# Patient Record
Sex: FEMALE | Race: WHITE | NOT HISPANIC OR LATINO | Employment: FULL TIME | URBAN - METROPOLITAN AREA
[De-identification: names, ages, dates, MRNs, and addresses within clinical notes are randomized per-mention and may not be internally consistent; named-entity substitution may affect disease eponyms.]

---

## 2017-02-07 ENCOUNTER — ALLSCRIPTS OFFICE VISIT (OUTPATIENT)
Dept: OTHER | Facility: OTHER | Age: 27
End: 2017-02-07

## 2017-05-12 ENCOUNTER — ALLSCRIPTS OFFICE VISIT (OUTPATIENT)
Dept: OTHER | Facility: OTHER | Age: 27
End: 2017-05-12

## 2017-09-27 ENCOUNTER — ALLSCRIPTS OFFICE VISIT (OUTPATIENT)
Dept: OTHER | Facility: OTHER | Age: 27
End: 2017-09-27

## 2018-01-10 NOTE — MISCELLANEOUS
Message   Recorded as Task   Date: 09/01/2016 04:04 PM, Created By: Belinda Prieto   Task Name: Miscellaneous   Assigned To: Adenike Gillsi   Regarding Patient: Edgar Tang, Status: In Progress   Gera Alcala - 01 Sep 2016 4:04 PM     TASK CREATED  Caller: Self; [phone] (Home)  [facility] please advise pt @  [address] - 01 Sep 2016 4:07 PM     TASK IN [address] [place] [address],[address] - 01 Sep 2016 4:08 PM     TASK EDITED                 made pt aware  Active Problems    1  Abnormal blood chemistry (790 6) (R79 9)   2  Acne (706 1) (L70 9)   3  Adenoma of pituitary (227 3) (D35 2)   4  Chronic migraine (346 70) (G43 709)   5  Chronic migraine without aura without status migrainosus, not intractable (346 70)   (G43 709)   6  Chronic tension-type headache, not intractable (339 12) (G44 229)   7  Contraceptives (V25 02)   8  Cyst of Rathke's pouch (253 8) (E23 6)   9  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)   10  Fatigue (780 79) (R53 83)   11  Herpes simplex type 1 infection (054 9) (B00 9)   12  Hyperprolactinemia (253 1) (E22 1)   13  Hypersomnia with long sleep time, idiopathic (327 11) (G47 11)   14  Motion sickness (994 6) (T75 3XXA)   15  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)   16  Primary stabbing headache (339 85) (G44 85)   17  Sexually Active, Contraception Desired   18  Sleep disturbance (780 50) (G47 9)   19  Vaginal discharge (623 5) (N89 8)   20  Vitamin D deficiency (268 9) (E55 9)    Current Meds   1  Atralin 0 05 % External Gel (Tretinoin); Therapy: [id number] to Recorded   2  Ketorolac Tromethamine 10 MG Oral Tablet; take 1 tablet every 8 hours as needed for   pain; Therapy: 34PQN0248 to (Jakub James)  Requested for: 11Pey5484; Last   Rx:[id number] Ordered   3  Magnesium CAPS; Therapy: ([facility]) to Recorded   4  Mirena 20 MCG/24HR Intrauterine Intrauterine Device; Therapy: ([facility] [place]) to Recorded   5  Spironolactone 50 MG Oral Tablet; TAKE 1 TABLET TWICE DAILY; Therapy: (Recorded:60Yth9652) to Recorded   6  ValACYclovir HCl - 1 GM Oral Tablet; uses 2 grams dsily; Therapy: (Shirlie Grapes) to Recorded   7  Vitamin D 1000 UNIT Oral Tablet; Takes 2 daily; Therapy: (Recorded:28Tdi5922) to Recorded    Allergies    1  Amoxicillin CAPS   2  Codeine Sulfate TABS    3  Nickel   4   Other    Signatures   Electronically signed by : Karina Goldman LPN; Sep  1 8962  5:94VE EST                       (Author)

## 2018-01-12 VITALS — SYSTOLIC BLOOD PRESSURE: 133 MMHG | DIASTOLIC BLOOD PRESSURE: 70 MMHG | TEMPERATURE: 98.5 F

## 2018-01-13 VITALS — SYSTOLIC BLOOD PRESSURE: 138 MMHG | TEMPERATURE: 97.9 F | DIASTOLIC BLOOD PRESSURE: 76 MMHG

## 2018-01-14 VITALS — TEMPERATURE: 98.4 F | DIASTOLIC BLOOD PRESSURE: 83 MMHG | HEART RATE: 82 BPM | SYSTOLIC BLOOD PRESSURE: 132 MMHG

## 2018-01-18 NOTE — MISCELLANEOUS
Message   Recorded as Task   Date: 01/05/2016 02:34 PM, Created By: Elena Flores   Task Name: Call Patient with results   Assigned To: Arnie Viramontes   Regarding Patient: Bubba Alatorre, Status: Complete   Jose Luis Ellis - 05 Jan 2016 2:34 PM     Patient Phone: [phone]      Not all tests were done  will call labs to find out other ordered blood test  For elevted prl and low normal Ft4 will repeat levels  Pls ask patient if she has any new symptoms? Sandra Javed - 05 Jan 2016 3:07 PM     TASK REASSIGNED: Previously Assigned To Jeramy Frederick - 15 Pierre 2016 2:01 PM     TASK EDITED  she hasnt done the other test yet and she will let us know when she does it   Arnie Viramontes - 18 Jan 2016 12:33 PM     TASK COMPLETED   pls politely remind patient one more time about fu labs and plan as outlined in this and prev result note   thanks      Signatures   Electronically signed by : MONET Garcia ; Feb 11 2016  4:05PM EST                       (Author)

## 2018-02-21 ENCOUNTER — TELEPHONE (OUTPATIENT)
Dept: NEUROLOGY | Facility: CLINIC | Age: 28
End: 2018-02-21

## 2018-02-21 ENCOUNTER — PROCEDURE VISIT (OUTPATIENT)
Dept: NEUROLOGY | Facility: CLINIC | Age: 28
End: 2018-02-21
Payer: COMMERCIAL

## 2018-02-21 VITALS — HEART RATE: 95 BPM | TEMPERATURE: 98.6 F | SYSTOLIC BLOOD PRESSURE: 133 MMHG | DIASTOLIC BLOOD PRESSURE: 61 MMHG

## 2018-02-21 DIAGNOSIS — G43.709 CHRONIC MIGRAINE WITHOUT AURA WITHOUT STATUS MIGRAINOSUS, NOT INTRACTABLE: Primary | ICD-10-CM

## 2018-02-21 PROCEDURE — 64615 CHEMODENERV MUSC MIGRAINE: CPT | Performed by: PSYCHIATRY & NEUROLOGY

## 2018-02-21 NOTE — TELEPHONE ENCOUNTER
Pt called states that she was looking at her chart online and noticed an MRI result back in 4/28/15  She has few questions and requesting to talk to you regarding the result          Thank you      [phone]

## 2018-02-21 NOTE — TELEPHONE ENCOUNTER
Patient reports she was actually calling about CT from 2/2015 and mispoke  She is concerned about the lacune noted  Please advise

## 2018-02-21 NOTE — PROGRESS NOTES
Chemodenervation  Date/Time: 2/21/2018 9:56 AM  Performed by: Margo Traore by: Cayla Fatima     Anesthesia (see MAR for exact dosages): Anesthesia method:  None  Procedure details:     Position:  Supine  Botox:     Botox Type:  Type A    mL's of Botulinum Toxin:  100    Final Concentration per CC:  100 units  Procedures:     Indications: migraines    Total Units:     Total units used:  100  Post-procedure details:     Chemodenervation:  Chronic migraine  Comments:      10 units in right temporalis muscle   10 units in the  Right frontalis muscle   35 unit(s) was injected into the  left frontalis muscle  Back of the scar   5 units in front of the scar to even out the arch eye brow  40 unit(s) was injected into the  left temporalis muscle   Around the scar tissue

## 2018-05-11 ENCOUNTER — ANNUAL EXAM (OUTPATIENT)
Dept: OBGYN CLINIC | Facility: CLINIC | Age: 28
End: 2018-05-11
Payer: COMMERCIAL

## 2018-05-11 VITALS
SYSTOLIC BLOOD PRESSURE: 124 MMHG | BODY MASS INDEX: 24.91 KG/M2 | WEIGHT: 155 LBS | DIASTOLIC BLOOD PRESSURE: 72 MMHG | HEIGHT: 66 IN

## 2018-05-11 DIAGNOSIS — Z11.3 SCREENING FOR STDS (SEXUALLY TRANSMITTED DISEASES): ICD-10-CM

## 2018-05-11 DIAGNOSIS — Z01.419 ENCNTR FOR GYN EXAM (GENERAL) (ROUTINE) W/O ABN FINDINGS: Primary | ICD-10-CM

## 2018-05-11 PROCEDURE — 87591 N.GONORRHOEAE DNA AMP PROB: CPT | Performed by: PHYSICIAN ASSISTANT

## 2018-05-11 PROCEDURE — 87491 CHLMYD TRACH DNA AMP PROBE: CPT | Performed by: PHYSICIAN ASSISTANT

## 2018-05-11 PROCEDURE — G0145 SCR C/V CYTO,THINLAYER,RESCR: HCPCS | Performed by: PHYSICIAN ASSISTANT

## 2018-05-11 PROCEDURE — S0612 ANNUAL GYNECOLOGICAL EXAMINA: HCPCS | Performed by: PHYSICIAN ASSISTANT

## 2018-05-11 RX ORDER — DEXTROAMPHETAMINE SACCHARATE, AMPHETAMINE ASPARTATE MONOHYDRATE, DEXTROAMPHETAMINE SULFATE AND AMPHETAMINE SULFATE 2.5; 2.5; 2.5; 2.5 MG/1; MG/1; MG/1; MG/1
CAPSULE, EXTENDED RELEASE ORAL
COMMUNITY
Start: 2017-03-29

## 2018-05-11 RX ORDER — VALACYCLOVIR HYDROCHLORIDE 1 G/1
TABLET, FILM COATED ORAL
COMMUNITY
Start: 2016-12-06

## 2018-05-11 NOTE — PROGRESS NOTES
Rachell Juve  1990    CC:  Yearly exam    S:  33 y o  female here for yearly exam  She is sexually active with a boyfriend of 3 years  She uses Mirena for contraception and is amenorrheic with this  Last Pap 2014      Current Outpatient Prescriptions:     amphetamine-dextroamphetamine (ADDERALL XR, 10MG,) 10 MG 24 hr capsule, Take one capsule in the AM (BRAND NAME), Disp: , Rfl:     onabotulinumtoxin A (BOTOX) 100 units, Inject as directed, Disp: , Rfl:     valACYclovir (VALTREX) 1,000 mg tablet, Take one tablet/day, as needed, Disp: , Rfl:     cholecalciferol (VITAMIN D3) 1,000 units tablet, Take by mouth, Disp: , Rfl:     levonorgestrel (MIRENA, 52 MG,) 20 MCG/24HR IUD, by Intrauterine route, Disp: , Rfl:     Tretinoin-Cleanser-Moisturizer 0 025 % CREAM KIT, EVERY OTHER DAY AT NIGHT, Disp: , Rfl:   Social History     Social History    Marital status: Single     Spouse name: N/A    Number of children: N/A    Years of education: N/A     Occupational History    Not on file  Social History Main Topics    Smoking status: Former Smoker    Smokeless tobacco: Never Used    Alcohol use Yes      Comment: socially     Drug use: No    Sexual activity: Yes     Partners: Male     Birth control/ protection: IUD      Comment: Mirena 3/2015     Other Topics Concern    Not on file     Social History Narrative    No narrative on file     Family History   Problem Relation Age of Onset    Hypertension Mother     Migraines Mother     Hypertension Father     Diabetes Maternal Grandfather     Skin cancer Maternal Grandfather     Hypertension Paternal Grandfather      Past Medical History:   Diagnosis Date    Depression     FH: brain aneurysm     Herpes     Migraine          O:  Blood pressure 124/72, height 5' 6 14" (1 68 m), weight 70 3 kg (155 lb)      Patient appears well and is not in distress  Neck is supple without masses  Breasts are symmetrical without mass, tenderness, nipple discharge, skin changes or adenopathy  Abdomen is soft and nontender without masses  External genitals are normal without lesions or rashes  Vagina is normal without discharge or bleeding  Cervix is normal without discharge or lesion  IUD strings are normal    Uterus is normal, mobile, nontender without palpable mass  Adnexa are normal, nontender, without palpable mass  A:  Yearly exam      P:   Pap and GC/chlamydia today   Mirena due for swap 3/2020   RTO one year for yearly exam or sooner as needed

## 2018-05-16 LAB
CHLAMYDIA DNA CVX QL NAA+PROBE: NORMAL
N GONORRHOEA DNA GENITAL QL NAA+PROBE: NORMAL

## 2018-05-17 LAB
LAB AP GYN PRIMARY INTERPRETATION: NORMAL
Lab: NORMAL

## 2018-06-08 ENCOUNTER — TELEPHONE (OUTPATIENT)
Dept: NEUROLOGY | Facility: CLINIC | Age: 28
End: 2018-06-08

## 2018-06-15 NOTE — TELEPHONE ENCOUNTER
Per Teri, pts Botox order is still stuck in the insur  Verif  Stage, the rep will email the supervisor to have this worked on ASAP  I will f/u early next week to check the status of this order

## 2018-06-18 NOTE — TELEPHONE ENCOUNTER
Per CBC review at [facility]  Pts Botox [facility] was denied due to not enough clinical info  Given to the department  I Faxed over pts notes and botox notes to be reviewed    I will call back tomm to check status

## 2018-06-19 NOTE — TELEPHONE ENCOUNTER
Per MRD at [facility], they still had to ask some questions to review pts Botox auth request  I will f/u on this status [facility]    Phone number

## 2018-06-26 NOTE — TELEPHONE ENCOUNTER
pts 100 units of Botox arrived from [employer] today in [facility]   In Fridge

## 2018-07-06 RX ORDER — ALPRAZOLAM 0.5 MG/1
0.5 TABLET, EXTENDED RELEASE ORAL DAILY
Refills: 1 | COMMUNITY
Start: 2018-06-27

## 2018-07-06 NOTE — PROGRESS NOTES
Chemodenervation  Date/Time: 7/9/2018 11:18 AM  Performed by: Carole [facility]  Authorized by: Matt Eaton details:     Prepped With: Alcohol    Anesthesia (see MAR for exact dosages): Anesthesia method:  None  Procedure details:     Position:  Upright  Botox:     Botox Type:  Type A    Brand:  Botox    Final Concentration per CC:  100 units    Needle Gauge:  30 G 2 5 inch    Medication Administration:  100 Units onabotulinumtoxin A 100 units  Procedures:     Botox Procedures: chronic headache      Indications: migraines    Post-procedure details:     Chemodenervation:  Chronic migraine    Patient tolerance of procedure: Tolerated well, no immediate complications       10 units in right temporalis muscle   10 units in the  Right frontalis muscle   35 unit(s) was injected into the  left frontalis muscle  Back of the scar   5 units in front of the scar to even out the arch eye brow  40 unit(s) was injected into the  left temporalis muscle   Around the scar tissue

## 2018-07-09 ENCOUNTER — PROCEDURE VISIT (OUTPATIENT)
Dept: NEUROLOGY | Facility: CLINIC | Age: 28
End: 2018-07-09
Payer: COMMERCIAL

## 2018-07-09 VITALS — TEMPERATURE: 98.8 F | HEART RATE: 92 BPM | DIASTOLIC BLOOD PRESSURE: 78 MMHG | SYSTOLIC BLOOD PRESSURE: 116 MMHG

## 2018-07-09 DIAGNOSIS — G43.709 CHRONIC MIGRAINE WITHOUT AURA WITHOUT STATUS MIGRAINOSUS, NOT INTRACTABLE: Primary | ICD-10-CM

## 2018-07-09 PROCEDURE — 64615 CHEMODENERV MUSC MIGRAINE: CPT | Performed by: PSYCHIATRY & NEUROLOGY

## 2018-10-17 ENCOUNTER — TELEPHONE (OUTPATIENT)
Dept: NEUROLOGY | Facility: CLINIC | Age: 28
End: 2018-10-17

## 2018-10-25 ENCOUNTER — TELEPHONE (OUTPATIENT)
Dept: NEUROLOGY | Facility: CLINIC | Age: 28
End: 2018-10-25

## 2018-10-31 NOTE — TELEPHONE ENCOUNTER
Botox 200 units arrived today  Placed in [address]  and logged  Will be transferred to [facility] location today

## 2018-11-05 RX ORDER — DOXEPIN HYDROCHLORIDE 6 MG/1
6 TABLET ORAL DAILY
Refills: 0 | COMMUNITY
Start: 2018-09-14

## 2018-11-05 NOTE — PROGRESS NOTES
Chemodenervation  Date/Time: 11/6/2018 9:56 AM  Performed by: Georges Goldberg by: Chelo Zuniga details:     Prepped With: Alcohol    Anesthesia (see MAR for exact dosages): Anesthesia method:  None  Procedure details:     Position:  Upright  Botox:     Botox Type:  Type A    Brand:  Botox    mL's of Botulinum Toxin:  100    Final Concentration per CC:  100 units    Needle Gauge:  30 G 2 5 inch    Medication Administration:  100 Units onabotulinumtoxin A 100 units  Total Units:     Total units used:  100    Total units discarded:  0  Post-procedure details:     Chemodenervation:  Chronic migraine    Facial Nerve Location[de-identified]  Bilateral facial nerve    Patient tolerance of procedure: Tolerated well, no immediate complications      Post-procedure details:     Chemodenervation:  Chronic migraine    Patient tolerance of procedure: Tolerated well, no immediate complications   52 units in right temporalis muscle   10 units in the  Right frontalis muscle   35 unit(s) was injected into the  left frontalis muscle   Back of the scar   5 units in front of the scar to even out the arch eye brow  40 unit(s) was injected into the  left temporalis muscle   Around the scar tissue

## 2018-11-06 PROCEDURE — 64615 CHEMODENERV MUSC MIGRAINE: CPT | Performed by: PSYCHIATRY & NEUROLOGY

## 2018-11-07 ENCOUNTER — PROCEDURE VISIT (OUTPATIENT)
Dept: NEUROLOGY | Facility: CLINIC | Age: 28
End: 2018-11-07
Payer: COMMERCIAL

## 2018-11-07 VITALS — DIASTOLIC BLOOD PRESSURE: 82 MMHG | TEMPERATURE: 98.1 F | SYSTOLIC BLOOD PRESSURE: 130 MMHG

## 2018-11-07 DIAGNOSIS — G43.709 CHRONIC MIGRAINE WITHOUT AURA WITHOUT STATUS MIGRAINOSUS, NOT INTRACTABLE: Primary | ICD-10-CM

## 2019-02-22 ENCOUNTER — TELEPHONE (OUTPATIENT)
Dept: NEUROLOGY | Facility: CLINIC | Age: 29
End: 2019-02-22

## 2019-02-22 NOTE — TELEPHONE ENCOUNTER
General 02/21/2019 12:19 PM Dorene Hitchcock care coordination -   Note    Auth not needed     Please use [facility]      Thank you

## 2019-02-22 NOTE — TELEPHONE ENCOUNTER
Called [facility]- spoke with Anabell Cardona- I made her aware that I was calling to do a refill on the patient's Botox prescription  Refill request initiated  Will need to do a status check in two days

## 2019-02-27 NOTE — TELEPHONE ENCOUNTER
Called [facility]- spoke with Miguel A Nicholson- he states the patient's Botox order is currently in insurance information  Will do a status check in 2 days

## 2019-03-04 NOTE — TELEPHONE ENCOUNTER
Called and spoke with rep Tom Yuan and he stated that the order is till in with insurance  [facility] still needs to be verified  It is under a STAT order  I did give  A NBD of 03/08/19 in their system  I will call back tomorrow to check on the status

## 2019-03-07 NOTE — TELEPHONE ENCOUNTER
Called and spoke with rep Juan Hernandez and I set up delivery for tomorrow 03/08/19 in the   She did state that she was still waiting on consent from the patient so I will call her and have her call [facility] this morning

## 2019-03-08 NOTE — TELEPHONE ENCOUNTER
Spoke with rep Marcia Mahajan who stated order is scheduled to be delivered by the end of day today  [employer] tracking # [id number]

## 2019-03-11 NOTE — PROGRESS NOTES
Chemodenervation  Date/Time: 3/13/2019 9:52 AM  Performed by: Dima Macdonald MD  Authorized by: Dima Macdonald MD     Pre-procedure details:     Prepped With: Alcohol    Anesthesia (see MAR for exact dosages): Anesthesia method:  None  Procedure details:     Position:  Upright  Botox:     Botox Type:  Type A    Brand:  Botox    Final Concentration per CC:  100 units    Needle Gauge:  30 G 2 5 inch    Medication Administration:  100 Units onabotulinumtoxin A 100 units  Post-procedure details:     Chemodenervation:  Chronic migraine    Facial Nerve Location[de-identified]  Bilateral facial nerve    Patient tolerance of procedure: Tolerated well, no immediate complications       Post-procedure details:     Chemodenervation:  Chronic migraine    Patient tolerance of procedure:  Tolerated well, no immediate complications   51 units in right temporalis muscle   10 units in the  Right frontalis muscle   35 unit(s) was injected into the  left frontalis muscle   Back of the scar   5 units in front of the scar to even out the arch eye brow  40 unit(s) was injected into the  left temporalis muscle  Around the scar tissue    Total Units:     Total units used:  100    Total units discarded:  0  Post-procedure details:     Chemodenervation:  Chronic migraine    Facial Nerve Location[de-identified]  Bilateral facial nerve    Patient tolerance of procedure:   Tolerated well, no immediate complications

## 2019-03-13 ENCOUNTER — PROCEDURE VISIT (OUTPATIENT)
Dept: NEUROLOGY | Facility: CLINIC | Age: 29
End: 2019-03-13
Payer: COMMERCIAL

## 2019-03-13 VITALS — HEART RATE: 98 BPM | SYSTOLIC BLOOD PRESSURE: 139 MMHG | TEMPERATURE: 97.5 F | DIASTOLIC BLOOD PRESSURE: 75 MMHG

## 2019-03-13 DIAGNOSIS — G43.709 CHRONIC MIGRAINE WITHOUT AURA WITHOUT STATUS MIGRAINOSUS, NOT INTRACTABLE: Primary | ICD-10-CM

## 2019-03-13 PROCEDURE — 64615 CHEMODENERV MUSC MIGRAINE: CPT | Performed by: PSYCHIATRY & NEUROLOGY

## 2019-05-26 ENCOUNTER — OFFICE VISIT (OUTPATIENT)
Dept: URGENT CARE | Age: 29
End: 2019-05-26
Payer: COMMERCIAL

## 2019-05-26 VITALS
RESPIRATION RATE: 16 BRPM | WEIGHT: 162 LBS | HEART RATE: 82 BPM | HEIGHT: 67 IN | BODY MASS INDEX: 25.43 KG/M2 | TEMPERATURE: 98.5 F | SYSTOLIC BLOOD PRESSURE: 124 MMHG | DIASTOLIC BLOOD PRESSURE: 84 MMHG | OXYGEN SATURATION: 98 %

## 2019-05-26 DIAGNOSIS — K11.20 SIALADENITIS: Primary | ICD-10-CM

## 2019-05-26 PROCEDURE — G0382 LEV 3 HOSP TYPE B ED VISIT: HCPCS | Performed by: NURSE PRACTITIONER

## 2019-05-26 PROCEDURE — S9083 URGENT CARE CENTER GLOBAL: HCPCS | Performed by: NURSE PRACTITIONER

## 2019-05-26 RX ORDER — AMOXICILLIN 500 MG/1
500 CAPSULE ORAL EVERY 12 HOURS SCHEDULED
Qty: 20 CAPSULE | Refills: 0 | Status: SHIPPED | OUTPATIENT
Start: 2019-05-26 | End: 2019-06-05

## 2019-06-24 ENCOUNTER — TELEPHONE (OUTPATIENT)
Dept: NEUROLOGY | Facility: CLINIC | Age: 29
End: 2019-06-24

## 2019-07-18 ENCOUNTER — PROCEDURE VISIT (OUTPATIENT)
Dept: NEUROLOGY | Facility: CLINIC | Age: 29
End: 2019-07-18
Payer: COMMERCIAL

## 2019-07-18 VITALS — SYSTOLIC BLOOD PRESSURE: 132 MMHG | HEART RATE: 112 BPM | DIASTOLIC BLOOD PRESSURE: 70 MMHG | TEMPERATURE: 98.1 F

## 2019-07-18 DIAGNOSIS — G43.709 CHRONIC MIGRAINE WITHOUT AURA WITHOUT STATUS MIGRAINOSUS, NOT INTRACTABLE: Primary | ICD-10-CM

## 2019-07-18 PROCEDURE — 64615 CHEMODENERV MUSC MIGRAINE: CPT | Performed by: PSYCHIATRY & NEUROLOGY

## 2019-07-18 NOTE — PROGRESS NOTES
Chemodenervation  Date/Time: 7/18/2019 12:49 PM  Performed by: Rossy Burdick MD  Authorized by: Rossy Burdick MD     Pre-procedure details:     Prepped With: Alcohol    Anesthesia (see MAR for exact dosages): Anesthesia method:  None  Procedure details:     Position:  Upright  Botox:     Botox Type:  Type A    Brand:  Botox    Final Concentration per CC:  200 units    Needle Gauge:  30 G 2 5 inch    Medication Administration:  100 Units onabotulinumtoxin A 100 units  Post-procedure details:     Chemodenervation:  Chronic migraine    Facial Nerve Location[de-identified]  Bilateral facial nerve    Patient tolerance of procedure: Tolerated well, no immediate complications      Post-procedure details:     Chemodenervation:  Chronic migraine    Patient tolerance of procedure:  Tolerated well, no immediate complications   90 units in right temporalis muscle   10 units in the  Right frontalis muscle   35 unit(s) was injected into the  left frontalis muscle   Back of the scar   5 units in front of the scar to even out the arch eye brow  40 unit(s) was injected into the  left temporalis muscle   Around the scar tissue     Total Units:     Total units used:  100    Total units discarded:  0  Post-procedure details:     Chemodenervation:  Chronic migraine    Facial Nerve Location[de-identified]  Bilateral facial nerve    Patient tolerance of procedure:  Tolerated well, no immediate complications

## 2019-11-06 ENCOUNTER — TELEPHONE (OUTPATIENT)
Dept: NEUROLOGY | Facility: CLINIC | Age: 29
End: 2019-11-06

## 2019-11-06 NOTE — TELEPHONE ENCOUNTER
Patient is scheduled on 11/27/2019 with Dr Vianney Mirza in [facility]

## 2019-11-06 NOTE — TELEPHONE ENCOUNTER
Referral Notes   Number of Notes: 1   Type Date User Summary Attachment   General 11/06/2019 10:51 AM Hal Juárez care coordination  -   Note    Botox- no authorization needed   Please use [facility]      Thank you,     Lisseth Giraldo

## 2019-11-07 NOTE — TELEPHONE ENCOUNTER
Called [facility] and spoke with rep Hanny Fernandez to initiate refill request for patient's Botox 100 unit SDV qty 1  Check back next week for status update

## 2019-11-12 NOTE — TELEPHONE ENCOUNTER
Called [facility] to check on status  Spoke with rep Tim who stated order is pending in insurance verification process  Requested order to updated to STAT request as I need medication delivered by 11/19/19 and MARQUITA SYKES on file is still active  Case # [id number]   Valid: 7/1/19 until 6/30/2020     Check back on Friday for status update

## 2019-11-14 NOTE — TELEPHONE ENCOUNTER
Received a voicemail from Gayla Felty at [facility]- she states she is calling to set up the patient's Botox delivery  Called [facility]- spoke with Terra Dials I advised her that I am calling to set up the patient's Botox delivery  She was able to confirm that the patient's Botox is ready to be set up for delivery and the patient's consent is on file  Botox to be delivered on 11/15/2019 to the [facility] location via [address]- a signature will be required  Nori,    Please await Botox delivery and document once the Botox has arrived  Please let me know if you do not receive the patient's Botox order      Thank you,    Mick Jackson

## 2019-11-27 ENCOUNTER — PROCEDURE VISIT (OUTPATIENT)
Dept: NEUROLOGY | Facility: CLINIC | Age: 29
End: 2019-11-27
Payer: COMMERCIAL

## 2019-11-27 VITALS — HEART RATE: 95 BPM | TEMPERATURE: 98.3 F | DIASTOLIC BLOOD PRESSURE: 82 MMHG | SYSTOLIC BLOOD PRESSURE: 128 MMHG

## 2019-11-27 DIAGNOSIS — G43.709 CHRONIC MIGRAINE WITHOUT AURA WITHOUT STATUS MIGRAINOSUS, NOT INTRACTABLE: Primary | ICD-10-CM

## 2019-11-27 PROCEDURE — 64615 CHEMODENERV MUSC MIGRAINE: CPT | Performed by: PSYCHIATRY & NEUROLOGY

## 2019-11-27 NOTE — PROGRESS NOTES
Chemodenervation  Date/Time: 11/27/2019 1:29 PM  Performed by: Josie Ko MD  Authorized by: Josie Ko MD     Pre-procedure details:     Prepped With: Alcohol    Anesthesia (see MAR for exact dosages): Anesthesia method:  None  Procedure details:     Position:  Upright  Botox:     Botox Type:  Type A    Brand:  Botox    mL's of Botulinum Toxin:  100    Final Concentration per CC:  100 units    Needle Gauge:  30 G 2 5 inch    Medication Administration:  100 Units onabotulinumtoxin A 100 units  Total Units:     Total units used:  100    Total units discarded:  0  Post-procedure details:     Chemodenervation:  Chronic migraine    Facial Nerve Location[de-identified]  Bilateral facial nerve         Post-procedure details:     Chemodenervation:  Chronic migraine    Patient tolerance of procedure:  Tolerated well, no immediate complications   62 units in right temporalis muscle   10 units in the  Right frontalis muscle   35 unit(s) was injected into the  left frontalis muscle   Back of the scar   5 units in front of the scar to even out the arch eye brow  40 unit(s) was injected into the  left temporalis muscle   Around the scar tissue     Total Units:     Total units used:  100    Total units discarded:  0  Post-procedure details:     Chemodenervation:  Chronic migraine    Facial Nerve Location[de-identified]  Bilateral facial nerve    Patient tolerance of procedure:  Tolerated well, no immediate complications

## 2020-01-21 ENCOUNTER — ANNUAL EXAM (OUTPATIENT)
Dept: OBGYN CLINIC | Facility: CLINIC | Age: 30
End: 2020-01-21
Payer: COMMERCIAL

## 2020-01-21 VITALS
SYSTOLIC BLOOD PRESSURE: 122 MMHG | DIASTOLIC BLOOD PRESSURE: 80 MMHG | WEIGHT: 163 LBS | BODY MASS INDEX: 25.58 KG/M2 | HEIGHT: 67 IN

## 2020-01-21 DIAGNOSIS — Z01.419 ENCNTR FOR GYN EXAM (GENERAL) (ROUTINE) W/O ABN FINDINGS: ICD-10-CM

## 2020-01-21 DIAGNOSIS — Z11.3 SCREEN FOR STD (SEXUALLY TRANSMITTED DISEASE): Primary | ICD-10-CM

## 2020-01-21 PROBLEM — G43.709 CHRONIC MIGRAINE WITHOUT AURA WITHOUT STATUS MIGRAINOSUS, NOT INTRACTABLE: Status: RESOLVED | Noted: 2018-02-21 | Resolved: 2020-01-21

## 2020-01-21 PROCEDURE — 87591 N.GONORRHOEAE DNA AMP PROB: CPT | Performed by: PHYSICIAN ASSISTANT

## 2020-01-21 PROCEDURE — G0145 SCR C/V CYTO,THINLAYER,RESCR: HCPCS | Performed by: PHYSICIAN ASSISTANT

## 2020-01-21 PROCEDURE — 87491 CHLMYD TRACH DNA AMP PROBE: CPT | Performed by: PHYSICIAN ASSISTANT

## 2020-01-21 PROCEDURE — S0612 ANNUAL GYNECOLOGICAL EXAMINA: HCPCS | Performed by: PHYSICIAN ASSISTANT

## 2020-01-21 NOTE — PROGRESS NOTES
Amber s  1990    CC:  Yearly exam    S:  30 y o  female here for yearly exam  She is amenorrheic with her Mirena which is due for swap - she will make an appointment for this  Sexual activity: She is sexually active with a partner of a year and a half and notes occasional postcoital spotting - she relates this at times to rougher intercourse  Contraception:  She uses Mirena  for contraception  STD testing:  She does request STD testing today  Gardasil:  She says she has had the Gardasil series  I cannot find documentation of this in her chart  We reviewed ASC guidelines for Pap testing       Last Pap 5/11/18 neg    Family hx of breast cancer: no  Family hx of ovarian cancer: no  Family hx of colon cancer: no      Current Outpatient Medications:     ALPRAZolam ER (XANAX XR) 0 5 MG 24 hr tablet, Take 0 5 mg by mouth daily , Disp: , Rfl: 1    amphetamine-dextroamphetamine (ADDERALL XR, 10MG,) 10 MG 24 hr capsule, Take one capsule in the AM (BRAND NAME), Disp: , Rfl:     cholecalciferol (VITAMIN D3) 1,000 units tablet, Take by mouth, Disp: , Rfl:     levonorgestrel (MIRENA, 52 MG,) 20 MCG/24HR IUD, by Intrauterine route, Disp: , Rfl:     onabotulinumtoxin A (BOTOX) 100 units, Inject as directed, Disp: , Rfl:     SILENOR 3 MG TABS, TAKE 1 5 TABLETS EVERY NIGHT, Disp: , Rfl: 0    valACYclovir (VALTREX) 1,000 mg tablet, Take one tablet/day, as needed, Disp: , Rfl:   Social History     Socioeconomic History    Marital status: Single     Spouse name: Not on file    Number of children: Not on file    Years of education: Not on file    Highest education level: Not on file   Occupational History    Not on file   Social Needs    Financial resource strain: Not on file    Food insecurity:     Worry: Not on file     Inability: Not on file    Transportation needs:     Medical: Not on file     Non-medical: Not on file   Tobacco Use    Smoking status: Former Smoker    Smokeless tobacco: Never Used   Substance and Sexual Activity    Alcohol use: Yes     Comment: socially     Drug use: No    Sexual activity: Yes     Partners: Male     Birth control/protection: IUD     Comment: Mirena 3/2015   Lifestyle    Physical activity:     Days per week: Not on file     Minutes per session: Not on file    Stress: Not on file   Relationships    Social connections:     Talks on phone: Not on file     Gets together: Not on file     Attends [employer]ism service: Not on file     Active member of club or organization: Not on file     Attends meetings of clubs or organizations: Not on file     Relationship status: Not on file    Intimate partner violence:     Fear of current or ex partner: Not on file     Emotionally abused: Not on file     Physically abused: Not on file     Forced sexual activity: Not on file   Other Topics Concern    Not on file   Social History Narrative    Not on file     Family History   Problem Relation Age of Onset    Hypertension Mother     Migraines Mother     Hypertension Father     Diabetes Maternal Grandfather     Skin cancer Maternal Grandfather     Hypertension Paternal Grandfather      Past Medical History:   Diagnosis Date    Depression     FH: brain aneurysm     Herpes     Migraine        Review of Systems   Respiratory: Negative  Cardiovascular: Negative  Gastrointestinal: Negative for constipation and diarrhea  Genitourinary: Negative for difficulty urinating, pelvic pain, vaginal bleeding, vaginal discharge, itching or odor  O:  Blood pressure 122/80, height 5' 6 93" (1 7 m), weight 73 9 kg (163 lb)  Patient appears well and is not in distress  Neck is supple without masses  Breasts are symmetrical without mass, tenderness, nipple discharge, skin changes or adenopathy  Abdomen is soft and nontender without masses  External genitals are normal without lesions or rashes    Urethra and urethral meatus are normal  Bladder is normal to palpation  Vagina is normal without discharge or bleeding  Cervix is normal without discharge or lesion  IUD strings are normal    Uterus is normal, mobile, nontender without palpable mass  Adnexa are normal, nontender, without palpable mass  A:   Yearly exam     Postcoital bleeding  P:   Pap and GC/chlamydia today   Return for Mirena insertion     RTO one year for yearly exam or sooner as needed

## 2020-01-22 LAB
C TRACH DNA SPEC QL NAA+PROBE: NEGATIVE
N GONORRHOEA DNA SPEC QL NAA+PROBE: NEGATIVE

## 2020-01-23 LAB
LAB AP GYN PRIMARY INTERPRETATION: NORMAL
Lab: NORMAL

## 2020-03-05 ENCOUNTER — TELEPHONE (OUTPATIENT)
Dept: NEUROLOGY | Facility: CLINIC | Age: 30
End: 2020-03-05

## 2020-03-05 NOTE — TELEPHONE ENCOUNTER
[address]- spoke with Palmira Fowler- I advised her I was calling to initiate a refill on the patient's Botox prescription  Refill request initiated  Will do a status check in 2 days

## 2020-03-09 NOTE — TELEPHONE ENCOUNTER
[address] Kuo- spoke with Thang Fernández- she states the patient's Botox order is still in insurance verification  An e-mail was sent to the insurance team to expedite this order  Order marked as STAT  Will do a status check in 2 days

## 2020-03-10 ENCOUNTER — TELEPHONE (OUTPATIENT)
Dept: OBGYN CLINIC | Facility: CLINIC | Age: 30
End: 2020-03-10

## 2020-03-11 NOTE — TELEPHONE ENCOUNTER
Called [facility] and spoke with rep David Palomo to check on status  Botox order is still pending in [facility] medical for verification  David Palomo is sending e-mail to [facility] supervisor to expedite order  She did note that order is pre scheduled for delivery to [facility] office for Tuesday 3/17/2020  Do status check in 2 days

## 2020-03-13 ENCOUNTER — PROCEDURE VISIT (OUTPATIENT)
Dept: OBGYN CLINIC | Facility: CLINIC | Age: 30
End: 2020-03-13
Payer: COMMERCIAL

## 2020-03-13 VITALS — SYSTOLIC BLOOD PRESSURE: 120 MMHG | DIASTOLIC BLOOD PRESSURE: 72 MMHG | BODY MASS INDEX: 25.9 KG/M2 | WEIGHT: 165 LBS

## 2020-03-13 DIAGNOSIS — Z30.430 ENCOUNTER FOR IUD INSERTION: Primary | ICD-10-CM

## 2020-03-13 PROCEDURE — 58300 INSERT INTRAUTERINE DEVICE: CPT | Performed by: PHYSICIAN ASSISTANT

## 2020-03-13 PROCEDURE — 58301 REMOVE INTRAUTERINE DEVICE: CPT | Performed by: PHYSICIAN ASSISTANT

## 2020-03-13 NOTE — TELEPHONE ENCOUNTER
[address] Dickson spoke with Aldair Farnsworth- she states the patient's Botox order is still in insurance verification  She is going to send them an e-mail to the [facility] team to expedite this order and make her aware of the patient's appointment on 3/31  She is putting a need by date of 3/18/20 on the order as well  Order marked as STAT  Will do a status check in 2 days

## 2020-03-13 NOTE — PROGRESS NOTES
Agustinjanicedarrell Josie  1990      CC:  IUD swap    S:  30 y o  female here for IUD swap  The reason for her swap is  Mirena  She requests Mirena IUD  We reviewed the risks of IUD insertion including pain, perforation, migration, perforation, irregular bleeding, and infection  She is aware that with Mirena she can expect up to 6 months of irregular bleeding and at that point her periods should become lighter, shorter, and less crampy, and that many women stop having periods with their Mirena  She did premedicate with ibuprofen and a snack prior to insertion  Package insert consent was signed by myself and the patient prior to the procedure  O:   Blood pressure 120/72, weight 74 8 kg (165 lb)  Patient appears well and is not in distress  Abdomen is soft and nontender  External genitals are normal without lesion or rash  Vagina is normal with menses present  Cervix is normal without lesion  PROCEDURE:   IUD strings were visualized, grasped with a ring forceps, and removed without difficulty  The cervix was cleansed with Betadine  The uterus was sounded to 7 cm  IUD was inserted to the fundus without dilation and without tenaculum  Strings were trimmed to 3cm    The patient tolerated the procedure well without complication  A:  IUD swap    P: The patient will return in one month for IUD check but knows to call sooner should she have problems prior to that visit

## 2020-03-16 NOTE — TELEPHONE ENCOUNTER
75 Aparna Kuo- spoke with Lisa Tom- she states the patient's Botox order is currently in insurance verification  Will reach out to Sergio Gonzales to expedite the patient's order

## 2020-03-16 NOTE — TELEPHONE ENCOUNTER
Called and spoke with Andrade Daniel- she is going to expedite this order and complete the process so I can set up delivery for the patient's upcoming appointment  Will do a status check tomorrow

## 2020-03-17 NOTE — TELEPHONE ENCOUNTER
75 Aparna Kuo- spoke with Nirali Call- she states the patient's Botox order is ready to be scheduled for delivery  Patient's consent is on file  Botox to be delivered on Tuesday 3/17/2020 to the [facility] location via [facility] overnight- a signature will be required  Jacky Myers,    Please await Botox delivery and document once it has arrived  Please let me know if you do not receive the patient's Botox order      Thank you,    Mendel Temple

## 2020-03-17 NOTE — TELEPHONE ENCOUNTER
Botox number of units: 100 units  Botox quantity:1  Arrived at what location:  [facility]   Lot number: [id number]    Placed in [address]  and logged

## 2020-03-31 ENCOUNTER — PROCEDURE VISIT (OUTPATIENT)
Dept: NEUROLOGY | Facility: CLINIC | Age: 30
End: 2020-03-31
Payer: COMMERCIAL

## 2020-03-31 VITALS — DIASTOLIC BLOOD PRESSURE: 88 MMHG | HEART RATE: 92 BPM | SYSTOLIC BLOOD PRESSURE: 128 MMHG | TEMPERATURE: 98.6 F

## 2020-03-31 DIAGNOSIS — G43.709 CHRONIC MIGRAINE WITHOUT AURA WITHOUT STATUS MIGRAINOSUS, NOT INTRACTABLE: Primary | ICD-10-CM

## 2020-03-31 PROCEDURE — 64615 CHEMODENERV MUSC MIGRAINE: CPT | Performed by: PSYCHIATRY & NEUROLOGY

## 2020-03-31 NOTE — PROGRESS NOTES
Chemodenervation  Date/Time: 3/31/2020 1:46 PM  Performed by: Neela Coley MD  Authorized by: Neela Coley MD     Pre-procedure details:     Prepped With: Alcohol    Anesthesia (see MAR for exact dosages): Anesthesia method:  None  Procedure details:     Position:  Upright  Botox:     Botox Type:  Type A    Brand:  Botox    mL's of Botulinum Toxin:  100    Final Concentration per CC:  100 units    Needle Gauge:  30 G 2 5 inch    Medication Administration:  100 Units onabotulinumtoxin A 100 units  Total Units:     Total units used:  100    Total units discarded:  0  Post-procedure details:     Chemodenervation:  Chronic migraine    Facial Nerve Location[de-identified]  Bilateral facial nerve    Patient tolerance of procedure: Tolerated well, no immediate complications           Post-procedure details:     Chemodenervation:  Chronic migraine    35 unit(s) was injected into the  left frontalis muscle   Back of the scar     5 units in front of the scar to even out the arch eye brow    40 unit(s) was injected into the  left temporalis muscle   Around the scar tissue    10 units in right temporalis muscle     10 units in the  Right frontalis muscle          Total Units:     Total units used:  100    Total units discarded:  0    Post-procedure details:     Chemodenervation:  Chronic migraine    Facial Nerve Location[de-identified]  Bilateral facial nerve    Patient tolerance of procedure:  Tolerated well, no immediate complications

## 2020-04-07 ENCOUNTER — TELEMEDICINE (OUTPATIENT)
Dept: OBGYN CLINIC | Facility: CLINIC | Age: 30
End: 2020-04-07
Payer: COMMERCIAL

## 2020-04-07 VITALS — BODY MASS INDEX: 25.27 KG/M2 | WEIGHT: 161 LBS

## 2020-04-07 DIAGNOSIS — N92.6 IRREGULAR BLEEDING: Primary | ICD-10-CM

## 2020-04-07 PROCEDURE — 99212 OFFICE O/P EST SF 10 MIN: CPT | Performed by: PHYSICIAN ASSISTANT

## 2020-04-10 ENCOUNTER — TRANSCRIBE ORDERS (OUTPATIENT)
Dept: ADMINISTRATIVE | Facility: HOSPITAL | Age: 30
End: 2020-04-10

## 2020-04-10 DIAGNOSIS — E04.1 THYROID NODULE: Primary | ICD-10-CM

## 2020-04-21 ENCOUNTER — HOSPITAL ENCOUNTER (OUTPATIENT)
Dept: RADIOLOGY | Facility: HOSPITAL | Age: 30
Discharge: HOME | End: 2020-04-21
Attending: FAMILY MEDICINE
Payer: COMMERCIAL

## 2020-04-21 DIAGNOSIS — E04.1 THYROID NODULE: ICD-10-CM

## 2020-04-21 PROCEDURE — 76536 US EXAM OF HEAD AND NECK: CPT

## 2020-06-30 ENCOUNTER — TELEPHONE (OUTPATIENT)
Dept: NEUROLOGY | Facility: CLINIC | Age: 30
End: 2020-06-30

## 2020-07-12 NOTE — PROGRESS NOTES
Patient ID: Ninfa Mcardle is a 30 y o  female  Assessment/Plan:    chronic migraine    This is a 80-year-old female with longstanding history of chronic migraines  Pt doing very well, no reported headaches, scar sensetivity well controlled with Botox as well  --  Continue Botox  Since starting Botox, patient reports greater than 7 days a migraine relief from baseline, correlated with headache diary  Decreased abortive medication use and decreased ER visits  Last Botox injections from March  -- patient to limit  Use of NSAID to no more than 3 times a week  --- maintain headache diary  -- maintain adequate fluid intake and appropriate sleep hygiene    Headache [facility]:   The patient was counseled regarding;   Discussed side effects of all medications prescribed today to the patient in detail  See above  Patient education was completed today and we also discussed precautions for rebound headaches  When patient has a moderate to severe headache, they should seek rest, initiate relaxation and apply cold compresses to the head  Also recommended to the patient :  1  Maintain regular sleep schedule  2  Limit over the counter medications  (No more than 3 times a week)  3  Maintain headache diary  4  Limit caffeine to 1-2 cups a day or less  5  Avoid dietary trigger  (list given to the patient and reviewed with them)  6  Patient is to have regular frequent meals to prevent headache onset  No problem-specific Assessment & Plan notes found for this encounter  Facial neuralgia    migraine     Diagnoses and all orders for this visit:    Migraine with aura and without status migrainosus, not intractable    Facial neuralgia           Subjective:    HPI    Grace Daniel is a 80-year-old,  Right-handed female with history of prior left aneurysm clipping in 2014 who presents for neurologic follow-up for chronic migraines  Patient's headaches are better with Botox  She has chronic nerve pain along the scar line   At this time, Headaches well controlled, patient not describing having any significant issues  The sensitivity around her scar side well controlled  What is your current pain level â 0/10  Headaches started at what age [de-identified] 14 y/o  Accident or injury prior to onset of headaches - none  How often do the headaches occur â migraine once every 2-3 months  What time of the day do the headaches start âvaries  How long do the headaches last - hours until she sleeps and wakes up the migraine is usually better  Are you ever headache free - yes  Where are they located âleft frontal, left temple  What is the intensity of pain âaverage pain level 1/10 mild up to 4/10  Any warning prior to headache and how long do they last â it started on the right eye with a circular area that tends to look a prism and is blurry  This can last for about an half hour or less and then headache starts immediately after  Sometimes vision loss for an hour or two  Describe your usual headache - sharp, throbbing, pounding  Associated symptoms: photophobia, sensitivity to smell, loss of appetite, nausea, prefer to be alone and in a dark room, unable to work, blurred vision  Headaches are worse if the patient: cough, sneeze, bending over  Headache trigger: computer screen lights, bright lights/ artificial and not sunlight  Are you current pregnant or planning on getting pregnant? no  What medications do you take or have you taken for your headaches? PREVENTIVE: Pamelor, Cyclobenzaprine, Verapamil, vitamin B2, gabapentin- caused diplopia , mag- 400 mg daily  ABORTIVE: Advil, Aleve, Toradol  Non-Medical/Alternative Treatments used in the past for headaches: none      Sleep disturbance: Since last seen has remained the same  Feels tired upon waking, sleep is somewhat restful, difficulty falling asleep, will awake 2-3 times a night, on average will get 8 hr of sleep a night  States only feels restful with 10+hr of sleep   Will mumble in sleep  Does note vivid dreams, and has nightmares  Had sleep study in high school told does not have sleep apnea but did not see sleep specialist  Took trazodone once with decreased sleep so has not taken again  Referred to sleep center told has poor sleep hygiene  The following portions of the patient's history were reviewed and updated as appropriate: allergies, current medications, past family history, past medical history, past social history, past surgical history and problem list          Objective:  Current Outpatient Medications   Medication Sig Dispense Refill    ALPRAZolam ER (XANAX XR) 0 5 MG 24 hr tablet Take 0 5 mg by mouth daily   1    amphetamine-dextroamphetamine (ADDERALL XR, 10MG,) 10 MG 24 hr capsule Take one capsule in the AM (BRAND NAME)      cholecalciferol (VITAMIN D3) 1,000 units tablet Take by mouth      levonorgestrel (MIRENA, 52 MG,) 20 MCG/24HR IUD by Intrauterine route      onabotulinumtoxin A (BOTOX) 100 units Inject as directed      SILENOR 3 MG TABS TAKE 1 5 TABLETS EVERY NIGHT  0    valACYclovir (VALTREX) 1,000 mg tablet Take one tablet/day, as needed       No current facility-administered medications for this visit  Blood pressure 138/87, pulse 92, temperature (!) 97 2 °F (36 2 °C), height 5' 7" (1 702 m), weight 75 3 kg (166 lb)  Physical Exam   Constitutional: She appears well-developed  HENT:   Head: Normocephalic  Eyes: Pupils are equal, round, and reactive to light  Neck: Normal range of motion  Cardiovascular: Normal rate and regular rhythm  Pulmonary/Chest: Effort normal    Musculoskeletal: Normal range of motion  Neurological: She has normal strength and normal reflexes  Coordination normal    Skin: Skin is warm  Psychiatric: She has a normal mood and affect  Her speech is normal and behavior is normal  Judgment and thought content normal        Neurological Exam  Mental Status  Awake, alert and oriented to person, place and time   Speech is normal  Language is fluent with no aphasia  Cranial Nerves  CN II: Visual fields full to confrontation  CN III, IV, VI: Extraocular movements intact bilaterally  Pupils equal round and reactive to light bilaterally  CN V: Facial sensation is normal   CN VII: Full and symmetric facial movement  CN XI: Shoulder shrug strength is normal   CN XII: Tongue midline without atrophy or fasciculations  Motor  Normal muscle bulk throughout  Normal muscle tone  Strength is 5/5 throughout all four extremities  Sensory  Sensation is intact to light touch, pinprick, vibration and proprioception in all four extremities  Reflexes  Deep tendon reflexes are 2+ and symmetric in all four extremities with downgoing toes bilaterally  Coordination  Finger-to-nose, rapid alternating movements and heel-to-shin normal bilaterally without dysmetria  Gait  Normal casual, toe, heel and tandem gait  ROS: personally reviewed with patient today's visit    Review of Systems  Constitutional: Negative  Negative for appetite change and fever  HENT: Negative  Negative for hearing loss, tinnitus, trouble swallowing and voice change  Eyes: Negative  Negative for photophobia and pain  Respiratory: Negative  Negative for shortness of breath  Cardiovascular: Negative  Negative for palpitations  Gastrointestinal: Negative  Negative for nausea and vomiting  Endocrine: Negative  Negative for cold intolerance  Genitourinary: Negative  Negative for dysuria, frequency and urgency  Musculoskeletal: Negative  Negative for myalgias and neck pain  Skin: Negative  Negative for rash  Neurological: Negative  Negative for dizziness, tremors, seizures, syncope, facial asymmetry, speech difficulty, weakness, light-headedness, numbness and headaches  Feeling well since injection   Hematological: Negative  Does not bruise/bleed easily  Psychiatric/Behavioral: Negative    Negative for confusion, hallucinations and sleep disturbance  Constitutional: Negative  Negative for appetite change and fever  HENT: Negative  Negative for hearing loss, tinnitus, trouble swallowing and voice change  Eyes: Negative  Negative for photophobia and pain  Respiratory: Negative  Negative for shortness of breath  Cardiovascular: Negative  Negative for palpitations  Gastrointestinal: Negative  Negative for nausea and vomiting  Endocrine: Negative  Negative for cold intolerance  Genitourinary: Negative  Negative for dysuria, frequency and urgency  Musculoskeletal: Negative  Negative for myalgias and neck pain  Skin: Negative  Negative for rash  Neurological: Negative  Negative for dizziness, tremors, seizures, syncope, facial asymmetry, speech difficulty, weakness, light-headedness, numbness and headaches  Feeling well since injection   Hematological: Negative  Does not bruise/bleed easily  Psychiatric/Behavioral: Negative  Negative for confusion, hallucinations and sleep disturbance

## 2020-07-13 ENCOUNTER — OFFICE VISIT (OUTPATIENT)
Dept: NEUROLOGY | Facility: CLINIC | Age: 30
End: 2020-07-13
Payer: COMMERCIAL

## 2020-07-13 VITALS
DIASTOLIC BLOOD PRESSURE: 87 MMHG | HEIGHT: 67 IN | WEIGHT: 166 LBS | TEMPERATURE: 97.2 F | SYSTOLIC BLOOD PRESSURE: 138 MMHG | BODY MASS INDEX: 26.06 KG/M2 | HEART RATE: 92 BPM

## 2020-07-13 DIAGNOSIS — G51.8 FACIAL NEURALGIA: ICD-10-CM

## 2020-07-13 DIAGNOSIS — G43.109 MIGRAINE WITH AURA AND WITHOUT STATUS MIGRAINOSUS, NOT INTRACTABLE: Primary | ICD-10-CM

## 2020-07-13 PROCEDURE — 99214 OFFICE O/P EST MOD 30 MIN: CPT | Performed by: NURSE PRACTITIONER

## 2020-07-13 NOTE — PROGRESS NOTES
Patient ID: Deni Brown is a 30 y o  female  Assessment/Plan:    No problem-specific Assessment & Plan notes found for this encounter  {Assess/PlanSmartLinks:54925}       Subjective:    HPI    {[facility] HPI texts:90550}    {Common ambulatory SmartLinks:54142}         Objective:    Blood pressure 138/87, pulse 92, temperature (!) 97 2 °F (36 2 °C), height 5' 7" (1 702 m), weight 75 3 kg (166 lb)  Physical Exam    Neurological Exam      ROS:    Review of Systems   Constitutional: Negative  Negative for appetite change and fever  HENT: Negative  Negative for hearing loss, tinnitus, trouble swallowing and voice change  Eyes: Negative  Negative for photophobia and pain  Respiratory: Negative  Negative for shortness of breath  Cardiovascular: Negative  Negative for palpitations  Gastrointestinal: Negative  Negative for nausea and vomiting  Endocrine: Negative  Negative for cold intolerance  Genitourinary: Negative  Negative for dysuria, frequency and urgency  Musculoskeletal: Negative  Negative for myalgias and neck pain  Skin: Negative  Negative for rash  Neurological: Negative  Negative for dizziness, tremors, seizures, syncope, facial asymmetry, speech difficulty, weakness, light-headedness, numbness and headaches  Feeling well since injection   Hematological: Negative  Does not bruise/bleed easily  Psychiatric/Behavioral: Negative  Negative for confusion, hallucinations and sleep disturbance

## 2020-07-13 NOTE — PATIENT INSTRUCTIONS
Patient clinically stable from a headache standpoint  Continue with Botox  Patient call the office given any increase in her headaches

## 2020-07-22 ENCOUNTER — TELEPHONE (OUTPATIENT)
Dept: NEUROLOGY | Facility: CLINIC | Age: 30
End: 2020-07-22

## 2020-07-22 NOTE — TELEPHONE ENCOUNTER
Patient is scheduled with Dr Aimee Rios on 8/18/2020 in the [facility] [address]

## 2020-07-22 NOTE — TELEPHONE ENCOUNTER
Type Date User Summary Attachment   General 07/21/2020  1:44 PM Zora Finch care coordination  -   Note    Botox- authorization #: [id number]- valid for 4 visits- from 7/16/2020 until 7/16/2021   Please use [facility]      Thank you,     Efraín Bonner

## 2020-07-31 NOTE — TELEPHONE ENCOUNTER
75 Aparna Kuo- spoke with Braydon Reynolds- I advised him I was calling to initiate a refill request for the patient's Botox order  She states there are no refills left on the patient's Botox prescription  Will transfer me to a pharmacist to provide a verbal Rx  Spoke with Teresa Beckman, pharmacist- provided a verbal Rx for Botox 100 units QTY: 1 under [facility] for Chronic Migraine with 3 refills  Will do a status check in 2 days

## 2020-08-03 ENCOUNTER — TELEPHONE (OUTPATIENT)
Dept: NEUROLOGY | Facility: CLINIC | Age: 30
End: 2020-08-03

## 2020-08-03 NOTE — TELEPHONE ENCOUNTER
Called patient and left a message informing her that her appointment has been changed from 08/04/20 to 08/18/20 due to Dr eBn Hogan being out of the office this week

## 2020-08-04 NOTE — TELEPHONE ENCOUNTER
75 Aparna Kuo- spoke with Staci Patricia- she states the patient's Botox order is in insurance verification in [facility]  Will do a status check in 2 days

## 2020-08-05 NOTE — TELEPHONE ENCOUNTER
Patient called back and left voicemail requesting to reschedule her December appointment to match the reschedule of the August appointment

## 2020-08-05 NOTE — TELEPHONE ENCOUNTER
Called patient and left a message to call back to reschedule her December appointment per her request

## 2020-08-06 NOTE — TELEPHONE ENCOUNTER
75 Aparna Kuo- spoke with Evelyn Kraft- she states the patient's Botox order is insurance verification  Will reach out to Nii Isabel to see if she can assist with this order  Delivery pre-scheduled for Thursday 8/13/2020 to the [facility] location  Will do a status check in 2 days

## 2020-08-06 NOTE — TELEPHONE ENCOUNTER
Called and left a detailed message with Shira Elizabeth to see if she can assist with this order  She is aware to call me back if there are any issues with the patient's order  Will do a status check in 2 days

## 2020-08-07 NOTE — TELEPHONE ENCOUNTER
Called [facility] specialty pharmacy- spoke with Danitza Abernathy- he states the patient's Botox order is in insurance verification  Will do a status check in 2 days

## 2020-08-10 NOTE — TELEPHONE ENCOUNTER
75 Aparna Kuo- spoke with Diane Case- she states the patient's Botox order is ready to be scheduled for delivery  Patient's consent is not on file  I attempted to contact the patient while I had [facility] on the phone- the patient did answer  She provided a blanket consent for 2020  Botox to be delivered on Thursday 8/13/2020 in the [facility] location via [address] [facility] overnight- a signature will be required  Nori,    Please await the patient's Botox order and document once it has arrived  Please let me know if you do not receive the patient's Botox order      Thank you,    Tatyana Rodrigez

## 2020-08-13 NOTE — TELEPHONE ENCOUNTER
Botox number of units: 100 units  Botox quantity: 1  Arrived at what location: [facility]  Lot number: [id number]  Expiration Date: 03/2023    Placed in [address]  and logged

## 2020-08-18 ENCOUNTER — PROCEDURE VISIT (OUTPATIENT)
Dept: NEUROLOGY | Facility: CLINIC | Age: 30
End: 2020-08-18
Payer: COMMERCIAL

## 2020-08-18 VITALS
HEIGHT: 67 IN | BODY MASS INDEX: 25.25 KG/M2 | SYSTOLIC BLOOD PRESSURE: 110 MMHG | WEIGHT: 160.9 LBS | TEMPERATURE: 97.8 F | DIASTOLIC BLOOD PRESSURE: 74 MMHG | HEART RATE: 92 BPM

## 2020-08-18 DIAGNOSIS — G43.709 CHRONIC MIGRAINE WITHOUT AURA WITHOUT STATUS MIGRAINOSUS, NOT INTRACTABLE: Primary | ICD-10-CM

## 2020-08-18 PROCEDURE — 64615 CHEMODENERV MUSC MIGRAINE: CPT | Performed by: PSYCHIATRY & NEUROLOGY

## 2020-08-18 NOTE — PROGRESS NOTES
Chemodenervation    Date/Time: 8/18/2020 1:26 PM  Performed by: Nabil Titus MD  Authorized by: Nabil Titus MD     Pre-procedure details:     Prepped With: Alcohol    Anesthesia (see MAR for exact dosages): Anesthesia method:  None  Procedure details:     Position:  Upright  Botox:     Botox Type:  Type A    Brand:  Botox    mL's of Botulinum Toxin:  100    Final Concentration per CC:  100 units    Needle Gauge:  30 G 2 5 inch  Total Units:     Total units used:  100    Total units discarded:  0  Post-procedure details:     Chemodenervation:  Chronic migraine    Facial Nerve Location[de-identified]  Bilateral facial nerve    Patient tolerance of procedure: Tolerated well, no immediate complications           Post-procedure details:     Chemodenervation:  Chronic migraine    35 unit(s) was injected into the  left frontalis muscle   Back of the scar     5 units in front of the scar to even out the arch eye brow    40 unit(s) was injected into the  left temporalis muscle   Around the scar tissue    10 units in right temporalis muscle     10 units in the  Right frontalis muscle          Total Units:     Total units used:  100    Total units discarded:  0    Post-procedure details:     Chemodenervation:  Chronic migraine    Facial Nerve Location[de-identified]  Bilateral facial nerve    Patient tolerance of procedure:  Tolerated well, no immediate complications

## 2020-11-10 ENCOUNTER — TELEPHONE (OUTPATIENT)
Dept: NEUROLOGY | Facility: CLINIC | Age: 30
End: 2020-11-10

## 2020-12-15 ENCOUNTER — TELEPHONE (OUTPATIENT)
Dept: NEUROLOGY | Facility: CLINIC | Age: 30
End: 2020-12-15

## 2020-12-24 ENCOUNTER — TELEPHONE (OUTPATIENT)
Dept: NEUROLOGY | Facility: CLINIC | Age: 30
End: 2020-12-24

## 2020-12-29 ENCOUNTER — PROCEDURE VISIT (OUTPATIENT)
Dept: NEUROLOGY | Facility: CLINIC | Age: 30
End: 2020-12-29
Payer: COMMERCIAL

## 2020-12-29 VITALS — SYSTOLIC BLOOD PRESSURE: 135 MMHG | DIASTOLIC BLOOD PRESSURE: 65 MMHG | HEART RATE: 89 BPM | TEMPERATURE: 98.3 F

## 2020-12-29 DIAGNOSIS — G43.709 CHRONIC MIGRAINE WITHOUT AURA WITHOUT STATUS MIGRAINOSUS, NOT INTRACTABLE: Primary | ICD-10-CM

## 2020-12-29 PROCEDURE — 64615 CHEMODENERV MUSC MIGRAINE: CPT | Performed by: PSYCHIATRY & NEUROLOGY

## 2021-02-10 ENCOUNTER — LAB (OUTPATIENT)
Dept: LAB | Age: 31
End: 2021-02-10
Payer: COMMERCIAL

## 2021-02-10 ENCOUNTER — TRANSCRIBE ORDERS (OUTPATIENT)
Dept: ADMINISTRATIVE | Age: 31
End: 2021-02-10

## 2021-02-10 DIAGNOSIS — Z00.00 ROUTINE GENERAL MEDICAL EXAMINATION AT A HEALTH CARE FACILITY: ICD-10-CM

## 2021-02-10 DIAGNOSIS — Z00.00 ROUTINE GENERAL MEDICAL EXAMINATION AT A HEALTH CARE FACILITY: Primary | ICD-10-CM

## 2021-02-10 LAB
ALBUMIN SERPL BCP-MCNC: 4 G/DL (ref 3.5–5)
ALP SERPL-CCNC: 45 U/L (ref 46–116)
ALT SERPL W P-5'-P-CCNC: 21 U/L (ref 12–78)
ANION GAP SERPL CALCULATED.3IONS-SCNC: 4 MMOL/L (ref 4–13)
AST SERPL W P-5'-P-CCNC: 12 U/L (ref 5–45)
BASOPHILS # BLD AUTO: 0.05 THOUSANDS/ΜL (ref 0–0.1)
BASOPHILS NFR BLD AUTO: 1 % (ref 0–1)
BILIRUB SERPL-MCNC: 0.72 MG/DL (ref 0.2–1)
BUN SERPL-MCNC: 9 MG/DL (ref 5–25)
CALCIUM SERPL-MCNC: 8.8 MG/DL (ref 8.3–10.1)
CHLORIDE SERPL-SCNC: 105 MMOL/L (ref 100–108)
CHOLEST SERPL-MCNC: 200 MG/DL (ref 50–200)
CO2 SERPL-SCNC: 29 MMOL/L (ref 21–32)
CREAT SERPL-MCNC: 0.83 MG/DL (ref 0.6–1.3)
EOSINOPHIL # BLD AUTO: 0.16 THOUSAND/ΜL (ref 0–0.61)
EOSINOPHIL NFR BLD AUTO: 3 % (ref 0–6)
ERYTHROCYTE [DISTWIDTH] IN BLOOD BY AUTOMATED COUNT: 13.3 % (ref 11.6–15.1)
GFR SERPL CREATININE-BSD FRML MDRD: 96 ML/MIN/1.73SQ M
GLUCOSE P FAST SERPL-MCNC: 81 MG/DL (ref 65–99)
HCT VFR BLD AUTO: 41.1 % (ref 34.8–46.1)
HDLC SERPL-MCNC: 79 MG/DL
HGB BLD-MCNC: 13.4 G/DL (ref 11.5–15.4)
IMM GRANULOCYTES # BLD AUTO: 0.01 THOUSAND/UL (ref 0–0.2)
IMM GRANULOCYTES NFR BLD AUTO: 0 % (ref 0–2)
LDLC SERPL CALC-MCNC: 108 MG/DL (ref 0–100)
LYMPHOCYTES # BLD AUTO: 1.65 THOUSANDS/ΜL (ref 0.6–4.47)
LYMPHOCYTES NFR BLD AUTO: 31 % (ref 14–44)
MCH RBC QN AUTO: 29.6 PG (ref 26.8–34.3)
MCHC RBC AUTO-ENTMCNC: 32.6 G/DL (ref 31.4–37.4)
MCV RBC AUTO: 91 FL (ref 82–98)
MONOCYTES # BLD AUTO: 0.44 THOUSAND/ΜL (ref 0.17–1.22)
MONOCYTES NFR BLD AUTO: 8 % (ref 4–12)
NEUTROPHILS # BLD AUTO: 2.99 THOUSANDS/ΜL (ref 1.85–7.62)
NEUTS SEG NFR BLD AUTO: 57 % (ref 43–75)
NONHDLC SERPL-MCNC: 121 MG/DL
NRBC BLD AUTO-RTO: 0 /100 WBCS
PLATELET # BLD AUTO: 267 THOUSANDS/UL (ref 149–390)
PMV BLD AUTO: 8.3 FL (ref 8.9–12.7)
POTASSIUM SERPL-SCNC: 3.8 MMOL/L (ref 3.5–5.3)
PROT SERPL-MCNC: 7.2 G/DL (ref 6.4–8.2)
RBC # BLD AUTO: 4.53 MILLION/UL (ref 3.81–5.12)
SODIUM SERPL-SCNC: 138 MMOL/L (ref 136–145)
T4 FREE SERPL-MCNC: 0.83 NG/DL (ref 0.76–1.46)
TRIGL SERPL-MCNC: 65 MG/DL
TSH SERPL DL<=0.05 MIU/L-ACNC: 1.48 UIU/ML (ref 0.36–3.74)
WBC # BLD AUTO: 5.3 THOUSAND/UL (ref 4.31–10.16)

## 2021-02-10 PROCEDURE — 84439 ASSAY OF FREE THYROXINE: CPT

## 2021-02-10 PROCEDURE — 85025 COMPLETE CBC W/AUTO DIFF WBC: CPT

## 2021-02-10 PROCEDURE — 36415 COLL VENOUS BLD VENIPUNCTURE: CPT

## 2021-02-10 PROCEDURE — 80053 COMPREHEN METABOLIC PANEL: CPT

## 2021-02-10 PROCEDURE — 84443 ASSAY THYROID STIM HORMONE: CPT

## 2021-02-10 PROCEDURE — 80061 LIPID PANEL: CPT

## 2021-02-24 ENCOUNTER — TRANSCRIBE ORDERS (OUTPATIENT)
Dept: ADMINISTRATIVE | Facility: HOSPITAL | Age: 31
End: 2021-02-24

## 2021-02-24 DIAGNOSIS — M79.645 PAIN IN LEFT FINGER(S): Primary | ICD-10-CM

## 2021-03-11 ENCOUNTER — HOSPITAL ENCOUNTER (OUTPATIENT)
Dept: RADIOLOGY | Age: 31
Discharge: HOME | End: 2021-03-11
Payer: COMMERCIAL

## 2021-03-11 DIAGNOSIS — M79.645 PAIN IN LEFT FINGER(S): ICD-10-CM

## 2021-03-11 PROCEDURE — G1004 CDSM NDSC: HCPCS

## 2021-03-11 PROCEDURE — 73218 MRI UPPER EXTREMITY W/O DYE: CPT

## 2021-03-19 ENCOUNTER — TELEPHONE (OUTPATIENT)
Dept: NEUROLOGY | Facility: CLINIC | Age: 31
End: 2021-03-19

## 2021-03-19 NOTE — TELEPHONE ENCOUNTER
Type Date User Summary Attachment   General 03/17/2021  3:37 PM Hal Juárez care coordination  -   Note    Botox- authorization #: [id number]- 3rd visit- valid from 7/16/2020 until 7/16/2021   Please use [facility]      Thank you,     Lisseth Giraldo

## 2021-03-31 NOTE — TELEPHONE ENCOUNTER
[address]  spoke with Dean Fitzpatrick, advised I was calling to initiate a refill for patient's botox medicaiton  Dean Fitzpatrick confirmed that patient has 2 remaining refills left on current prescription under [facility], asked Dean Fitzpatrick if she could transfer me to a pharmacist so I can cancel the current script and provide a new prescription under new ordering doctor  Dean Fitzpatrick transferred me to a pharmacist to provide a verbal prescription  Spoke with pharmacist Tyson Nayak, advised I was calling to cancel the prescription for Botox ordered by [facility] and to provide a verbal new prescription for Botox 100 units QTY 1 with 3 refills for Dx [id number] under [facility]  Patient's profile will be sent to insurance verification   Will call for a status check in 2 days

## 2021-04-02 NOTE — TELEPHONE ENCOUNTER
Called [facility] specialty pharmacy, spoke with Richelle Lackey advised I was calling to check the status of patient's botox order  Richelle Lackey states that patient's order is still in insurance verification, she marked patient's profile as STAT   Will call for a status check in 2 days

## 2021-04-06 NOTE — TELEPHONE ENCOUNTER
Called [facility] specialty pharmacy spoke with Shaina Johnson, advised I was calling to check the status of patient's botox order  Shaina Johnson states patient's profile is still in [facility] verification  Shaina Johnson escalated patient's profile to STAT and sent an email to the [facility] team to have this worked on  Will call for a status check in 2 days

## 2021-04-08 NOTE — TELEPHONE ENCOUNTER
Called [facility] specialty pharmacy spoke with Fabián advised I was calling to check the status of patient's botox order  Star states there is a note on patient's account that a pharmacist needs to speak to [facility] to confirm patient's prescription  Star transferred me to pharmacist Genevieve Jackson states that they wanted to verify a change in patient's prescription, she states that patient;s previous prescription was once every 4 months, the script we called in the other day was for every 3 months  They need to verify if this is an error or a change  I reviewed patient's account with fellow botox coordinator Wayne Alatorre  Patient was receiving Botox 100 units every 4 months from Dr Moses Casanova, however now that Dr Moses Casanova has left we are unsure if her new provide will want to keep injections for every 4 months or if this will change to the standard every 3 months  Authorization on file is approved for on year with 4 visits for 800 units total  We will keep the script for every 3 months in case the new provider wishes to changes patient plan of therapy    Genevieve Jackson states she will notate patient's account and they will proceed verifying patient's insurance, will call for a status check in 2 days

## 2021-04-09 DIAGNOSIS — Z23 ENCOUNTER FOR IMMUNIZATION: ICD-10-CM

## 2021-04-12 NOTE — TELEPHONE ENCOUNTER
Called [facility] spoke with Vero, advised I was calling to check the status of patient's botox order  [facility] states that order is ready to schedule for delivery however patient's consent for shipment is needed  Called patient with [facility] on the line, patient was able to provide her consent for shipment and order is ready to schedule    Botox is scheduled for delivery Wednesday 4/14/2021 to the [facility] location via [facility], please await patient's botox delivery and document once received, please advise if medication is not received by 2pm     Thanks  Fortunato Rice

## 2021-04-14 NOTE — TELEPHONE ENCOUNTER
Botox number of units: 100  Botox quantity: 1  Arrived at what location: [place]  Lot number: [id number]  Expiration Date: 7/30/23  Appt notes indicate correct medication: yes      Botox rec documented and stored in fridge

## 2021-04-29 ENCOUNTER — PROCEDURE VISIT (OUTPATIENT)
Dept: NEUROLOGY | Facility: CLINIC | Age: 31
End: 2021-04-29
Payer: COMMERCIAL

## 2021-04-29 VITALS — HEART RATE: 116 BPM | DIASTOLIC BLOOD PRESSURE: 78 MMHG | SYSTOLIC BLOOD PRESSURE: 136 MMHG | TEMPERATURE: 98.8 F

## 2021-04-29 DIAGNOSIS — G43.709 CHRONIC MIGRAINE WITHOUT AURA WITHOUT STATUS MIGRAINOSUS, NOT INTRACTABLE: Primary | ICD-10-CM

## 2021-04-29 PROCEDURE — 64615 CHEMODENERV MUSC MIGRAINE: CPT | Performed by: PHYSICIAN ASSISTANT

## 2021-04-29 NOTE — PROGRESS NOTES
Universal Protocol   Consent: Verbal consent obtained  Written consent obtained  Risks and benefits: risks, benefits and alternatives were discussed  Consent given by: patient  Time out: Immediately prior to procedure a "time out" was called to verify the correct patient, procedure, equipment, support staff and site/side marked as required  Patient understanding: patient states understanding of the procedure being performed  Patient consent: the patient's understanding of the procedure matches consent given  Procedure consent: procedure consent matches procedure scheduled  Relevant documents: relevant documents present and verified  Patient identity confirmed: verbally with patient        Chemodenervation     Date/Time 4/29/2021 2:30 PM     Performed by  Jen Fermin PA-C     Authorized by Jen Fermin PA-C        Pre-procedure details      Prepped With: Alcohol     Anesthesia  (see MAR for exact dosages): Anesthesia method:  None   Procedure details     Position:  Upright   Botox     Brand:  Botox    mL's of Botulinum Toxin:  100    Final Concentration per CC:  50 units    Needle Gauge:  30 G 2 5 inch   Total Units     Total units used:  100    Total units discarded:  0   Post-procedure details      Chemodenervation:  Chronic migraine    Facial Nerve Location[de-identified]  Bilateral facial nerve    Patient tolerance of procedure: Tolerated well, no immediate complications   Comments      45 unit(s) was injected into the  left frontalis muscle   Back of the scar      45 unit(s) was injected into the  left temporalis muscle   Around the scar tissue     10 units in the  Right frontalis muscle     All medically necessary

## 2021-05-05 ENCOUNTER — TRANSCRIBE ORDERS (OUTPATIENT)
Dept: ADMINISTRATIVE | Facility: HOSPITAL | Age: 31
End: 2021-05-05

## 2021-05-05 DIAGNOSIS — M79.645 PAIN IN LEFT FINGER(S): Primary | ICD-10-CM

## 2021-05-28 ENCOUNTER — HOSPITAL ENCOUNTER (OUTPATIENT)
Dept: RADIOLOGY | Facility: HOSPITAL | Age: 31
Discharge: HOME | End: 2021-05-28
Payer: COMMERCIAL

## 2021-05-28 DIAGNOSIS — M79.645 PAIN IN LEFT FINGER(S): ICD-10-CM

## 2021-05-28 PROCEDURE — 78315 BONE IMAGING 3 PHASE: CPT

## 2021-05-28 PROCEDURE — A9503 TC99M MEDRONATE: HCPCS

## 2021-07-15 ENCOUNTER — TELEPHONE (OUTPATIENT)
Dept: NEUROLOGY | Facility: CLINIC | Age: 31
End: 2021-07-15

## 2021-07-15 NOTE — TELEPHONE ENCOUNTER
Botox number of units: 100  Botox quantity: 1  Arrived at what location: [facility]  Botox at Correct Administering Location: yes  NDC number: 3821-8014-45  Lot number: [id number]  Expiration Date: 10/2023  Appt notes indicate correct medication: yes    Botox received, documented and stored in the fridge  Botox receipt emailed to Botox Coordinators and scanned into pt's chart

## 2021-07-21 ENCOUNTER — TELEPHONE (OUTPATIENT)
Dept: NEUROLOGY | Facility: CLINIC | Age: 31
End: 2021-07-21

## 2021-07-23 NOTE — TELEPHONE ENCOUNTER
Called patient and I left a message to call back to schedule a follow up with any of the new AP's prior to her 08/31/21 Botox appointment

## 2021-08-05 ENCOUNTER — TELEMEDICINE (OUTPATIENT)
Dept: NEUROLOGY | Facility: CLINIC | Age: 31
End: 2021-08-05
Payer: COMMERCIAL

## 2021-08-05 VITALS — BODY MASS INDEX: 26.06 KG/M2 | WEIGHT: 166 LBS | HEIGHT: 67 IN

## 2021-08-05 DIAGNOSIS — G51.8 FACIAL NEURALGIA: Primary | ICD-10-CM

## 2021-08-05 DIAGNOSIS — G43.109 MIGRAINE WITH AURA AND WITHOUT STATUS MIGRAINOSUS, NOT INTRACTABLE: ICD-10-CM

## 2021-08-05 PROCEDURE — 99213 OFFICE O/P EST LOW 20 MIN: CPT | Performed by: NURSE PRACTITIONER

## 2021-08-05 NOTE — PROGRESS NOTES
Virtual Regular Visit    Verification of patient location:    Patient is located in the following state in which I hold an active license [place]      Assessment/Plan:    Problem List Items Addressed This Visit        Cardiovascular and Mediastinum    Migraine with aura     Patient with longstanding history of chronic migraines  Over all doing very well, she has headaches once every few weeks  Her scar sensetivity and migraines Since starting Botox, patient reports greater than 7 days a migraine relief from baseline, correlated with headache diary  Decreased abortive medication use and decreased ER visits  For rescue she can continue to use advil, discussed to limit  Use of NSAID to no more than 3 times a week, maintain headache diary, maintain adequate fluid intake and appropriate sleep hygiene  Other    Facial neuralgia - Primary               Reason for visit is   Chief Complaint   Patient presents with    Virtual Regular Visit    Migraine        Encounter provider PHILIP Rawls    Provider located at [address] [place] [address]  [phone]      Recent Visits  No visits were found meeting these conditions  Showing recent visits within past 7 days and meeting all other requirements  Today's Visits  Date Type Provider Dept   08/05/21 Telemedicine Salty Lopez, [address]   Showing today's visits and meeting all other requirements  Future Appointments  No visits were found meeting these conditions  Showing future appointments within next 150 days and meeting all other requirements       The patient was identified by name and date of birth  He Casas was informed that this is a telemedicine visit and that the visit is being conducted through [address] [place] [address] Now and patient was informed that this is a secure, HIPAA-compliant platform  She agrees to proceed     My office door was closed     She acknowledged consent and understanding of privacy and security of the video platform  The patient has agreed to participate and understands they can discontinue the visit at any time  Patient is aware this is a billable service  Subjective  Daune Essex is a 35 y o  female here for follow up with chronic migraines  HPI   Ronnie uDke is a 41-year-old,  Right-handed female with history of prior left aneurysm clipping in 2014 who presents for neurologic follow-up for chronic migraines  Patient's headaches are better with Botox-prior to botox she would have daily pain on the left side of her head  With botox she has pretty much complete relief  She has noticed that she start with some discomfort about a week or so before her botox is due but this isn't all the time  She has chronic nerve pain along the scar line  At this time, Headaches well controlled, patient not describing having any significant issues  The sensitivity around her scar side well controlled  What is your current pain level : 0/10  Headaches started at what age: 14 y/o  Accident or injury prior to onset of headaches - none  How often do the headaches occur: currently migraine once every few weeks  What time of the day do the headaches start: varies  How long do the headaches last - a few hours, takes advil which helps  Are you ever headache free - yes  Where are they located: left frontal, left temple  What is the intensity of pain: average pain level 3-4/10 mild up to 6/10  Any warning prior to headache and how long do they last: prior when she was previous birth control would see prism, but no longer sees this with med changes to IUD and brand adderall     Describe your usual headache - sharp, throbbing, pounding  Associated symptoms: photophobia, sensitivity to smell, loss of appetite,  prefer to be alone and in a dark room, unable to work, blurred vision  Headaches are worse if the patient: no  Headache trigger: computer screen lights, bright lights/ artificial and not sunlight  Are you current pregnant or planning on getting pregnant? no  What medications do you take or have you taken for your headaches? PREVENTIVE: Pamelor, Cyclobenzaprine, Verapamil, vitamin B2, gabapentin- caused diplopia , mag- 400 mg daily  ABORTIVE: Advil, Aleve, Toradol  Non-Medical/Alternative Treatments used in the past for headaches: none      Sleep disturbance: This has been an issue her whole life  Is currently on SIlenor-this is prescribed by PCP  Lack of sleep can trigger migraines, but this medication has worked the best for her  Had sleep study in high school told does not have sleep apnea but did not see sleep specialist  Referred to sleep center told has poor sleep hygiene  Past Medical History:   Diagnosis Date    Depression     FH: brain aneurysm     Herpes     Migraine        Past Surgical History:   Procedure Laterality Date    CEREBRAL ANEURYSM REPAIR      CHOLECYSTECTOMY         Current Outpatient Medications   Medication Sig Dispense Refill    amphetamine-dextroamphetamine (ADDERALL XR, 10MG,) 10 MG 24 hr capsule Take one capsule in the AM (BRAND NAME)      cholecalciferol (VITAMIN D3) 1,000 units tablet Take by mouth      levonorgestrel (MIRENA, 52 MG,) 20 MCG/24HR IUD by Intrauterine route      onabotulinumtoxin A (BOTOX) 100 units Inject as directed      SILENOR 3 MG TABS TAKE 1 5 TABLETS EVERY NIGHT  0    valACYclovir (VALTREX) 1,000 mg tablet Take one tablet/day, as needed      ALPRAZolam ER (XANAX XR) 0 5 MG 24 hr tablet Take 0 5 mg by mouth daily  (Patient not taking: Reported on 8/5/2021)  1     No current facility-administered medications for this visit  Allergies   Allergen Reactions    Cobalt     Codeine     Edetic Acid     Ethylenediamine     Mercaptopurine      MERCAPTO MIX    Thimerosal     Nickel Itching and Rash     I have personally reviewed the ROS performed by the [employer] Constitutional: Negative  HENT: Negative  Eyes: Negative  Respiratory: Negative  Cardiovascular: Negative  Gastrointestinal: Negative  Endocrine: Negative  Genitourinary: Negative  Musculoskeletal: Negative  Skin: Negative  Allergic/Immunologic: Negative  Neurological: Negative  Hematological: Bruises/bleeds easily  Psychiatric/Behavioral: Negative  Video Exam    Vitals:    08/05/21 1226   Weight: 75 3 kg (166 lb)   Height: 5' 7" (1 702 m)       Physical Exam   Awake and alert  Speech normal  Hearing and facial sensation intact  No facial droop, able to puff out cheeks, tongue midline  EOM intact, no nystagmus  No pronator drift  Finger to nose normal, heel to shin normal  Moves all extremities without difficulty, sensation intact to light touch in all extremities  Normal gait including stride and arm swing  Arises from chair without difficulty  I spent 20 minutes directly with the patient during this visit    VIRTUAL VISIT [address] [place] [address] verbally agrees to participate in [employer]  Pt is aware that [employer] could be limited without vital signs or the ability to perform a full hands-on physical exam  Susan Love understands she or the provider may request at any time to terminate the video visit and request the patient to seek care or treatment in person

## 2021-08-05 NOTE — ASSESSMENT & PLAN NOTE
Patient with longstanding history of chronic migraines  Over all doing very well, she has headaches once every few weeks  Her scar sensetivity and migraines Since starting Botox, patient reports greater than 7 days a migraine relief from baseline, correlated with headache diary  Decreased abortive medication use and decreased ER visits  For rescue she can continue to use advil, discussed to limit  Use of NSAID to no more than 3 times a week, maintain headache diary, maintain adequate fluid intake and appropriate sleep hygiene

## 2021-08-12 NOTE — TELEPHONE ENCOUNTER
Per [employer] Botox Approval is as Follows:       Auth # [id number]  Valid : 08/09/2021-08/21/2022  Valid: 4 visits     Please use [address]

## 2021-08-13 NOTE — TELEPHONE ENCOUNTER
[address] [facility] and spoke with Jil Atkinson    He took all Auth information and sent it to the [employer] team  He states to call back with status

## 2021-08-16 NOTE — TELEPHONE ENCOUNTER
[address] [place] [address]    Botox 100 units to be delivered  Via [employer]  08/19/2021    Please advise if not received by 2pm or not at all

## 2021-08-19 NOTE — TELEPHONE ENCOUNTER
[address]  spoke with Joey Lopez, advised I was calling to check the status of patient's botox order that did not arrive as scheduled  Joey Lopez states that patient's order did not ship yesterday because final test claim came back as refill to soon  Joey Lopez states that Botox 100 units was delivered 7/15/2021 to the [facility] location  Reviewed patient's chart and confirmed that botox 100 units was received 7/15/21, also checked [facility] fridge and verified that medication is here  Roshni Roman that mistake was on our end and medication is already here      CLEO Ba    7/15/21 11:46 AM  Note     Botox number of units: 100  Botox quantity: 1  Arrived at what location: [address] [place] [address]  Botox at Correct Administering Location: yes  NDC number: 0097-8271-33  Lot number: [id number]  Expiration Date: 10/2023  Appt notes indicate correct medication: yes     Botox received, documented and stored in the fridge      Botox receipt emailed to Botox Coordinators and scanned into pt's chart

## 2021-08-23 ENCOUNTER — TELEPHONE (OUTPATIENT)
Dept: NEUROLOGY | Facility: CLINIC | Age: 31
End: 2021-08-23

## 2021-08-31 ENCOUNTER — PROCEDURE VISIT (OUTPATIENT)
Dept: NEUROLOGY | Facility: CLINIC | Age: 31
End: 2021-08-31
Payer: COMMERCIAL

## 2021-08-31 VITALS — HEART RATE: 109 BPM | SYSTOLIC BLOOD PRESSURE: 138 MMHG | DIASTOLIC BLOOD PRESSURE: 75 MMHG | TEMPERATURE: 97.7 F

## 2021-08-31 DIAGNOSIS — G43.709 CHRONIC MIGRAINE WITHOUT AURA WITHOUT STATUS MIGRAINOSUS, NOT INTRACTABLE: Primary | ICD-10-CM

## 2021-08-31 PROCEDURE — 64615 CHEMODENERV MUSC MIGRAINE: CPT | Performed by: PHYSICIAN ASSISTANT

## 2021-08-31 NOTE — PROGRESS NOTES
Universal Protocol   Consent: Verbal consent obtained  Written consent obtained  Risks and benefits: risks, benefits and alternatives were discussed  Consent given by: patient  Time out: Immediately prior to procedure a "time out" was called to verify the correct patient, procedure, equipment, support staff and site/side marked as required  Patient understanding: patient states understanding of the procedure being performed  Patient consent: the patient's understanding of the procedure matches consent given  Procedure consent: procedure consent matches procedure scheduled  Relevant documents: relevant documents present and verified  Patient identity confirmed: verbally with patient        Chemodenervation     Date/Time 8/31/2021 2:37 PM     Performed by  Esha Green PA-C     Authorized by Esha Green PA-C        Pre-procedure details      Preparation: Patient was prepped and draped in usual sterile fashion     Anesthesia  (see MAR for exact dosages): Anesthesia method:  None   Procedure details     Position:  Upright   Botox     Brand:  Botox    Final Concentration per CC:  50 units    Needle Gauge:  30 G 2 5 inch   Procedures     Botox Procedures: chronic headache      Indications: migraines     Injection Location      Head / Face:  L frontalis, R frontalis and L temporalis    L lateral frontalis:  45 unit(s)    R lateral frontalis:  10 unit(s)    Comments:  Back of scar on left    L temporalis injection amount:  45 unit(s)    Comments:  Around scar tissue   Post-procedure details      Chemodenervation:  Chronic migraine    Facial Nerve Location[de-identified]  Bilateral facial nerve    Patient tolerance of procedure:   Tolerated well, no immediate complications   Comments      All medically necessary

## 2021-09-07 ENCOUNTER — TELEPHONE (OUTPATIENT)
Dept: NEUROLOGY | Facility: CLINIC | Age: 31
End: 2021-09-07

## 2021-09-07 NOTE — TELEPHONE ENCOUNTER
Botox number of units: 100  Botox quantity: 1  Arrived at what location: [facility]  Botox at Correct Administering Location: YES  NDC number: 2654-3990-77  Lot number: [id number]  Expiration Date: 11/2023  Appt notes indicate correct medication: YES

## 2021-09-22 ENCOUNTER — NURSE TRIAGE (OUTPATIENT)
Dept: OTHER | Facility: OTHER | Age: 31
End: 2021-09-22

## 2021-09-22 NOTE — TELEPHONE ENCOUNTER
Regarding: COVID-19 - Symptomatic  ----- Message from Parveen Nobles sent at 9/22/2021 10:08 AM EDT -----  "I have a stuffy, runny nose, headache, sore throat and I am fatigue "

## 2021-09-22 NOTE — TELEPHONE ENCOUNTER
Reason for Disposition   Caller has cancelled the call before the first contact    Protocols used: NO CONTACT OR DUPLICATE CONTACT CALL-ADULT-AH

## 2021-11-30 ENCOUNTER — TELEPHONE (OUTPATIENT)
Dept: NEUROLOGY | Facility: CLINIC | Age: 31
End: 2021-11-30

## 2021-12-17 ENCOUNTER — TELEPHONE (OUTPATIENT)
Dept: NEUROLOGY | Facility: CLINIC | Age: 31
End: 2021-12-17

## 2021-12-17 NOTE — TELEPHONE ENCOUNTER
----- Message from Carmita James MA sent at 12/17/2021  3:46 PM EST -----  Regarding: new auth  Submitted authorization request through [employer]  Per website authorization is not required :    [address] does not require preauthorization for the service requested based on Product, Group, Benefit, Diagnosis, Place of Service or Prior Auth requirements

## 2021-12-20 DIAGNOSIS — G43.109 MIGRAINE WITH AURA AND WITHOUT STATUS MIGRAINOSUS, NOT INTRACTABLE: ICD-10-CM

## 2021-12-20 DIAGNOSIS — G51.8 FACIAL NEURALGIA: Primary | ICD-10-CM

## 2021-12-20 RX ORDER — ONABOTULINUMTOXINA 100 [USP'U]/1
INJECTION, POWDER, LYOPHILIZED, FOR SOLUTION INTRADERMAL; INTRAMUSCULAR
Qty: 1 EACH | Refills: 0 | Status: SHIPPED | OUTPATIENT
Start: 2021-12-20 | End: 2022-02-18

## 2022-01-10 ENCOUNTER — TELEPHONE (OUTPATIENT)
Dept: NEUROLOGY | Facility: CLINIC | Age: 32
End: 2022-01-10

## 2022-01-10 NOTE — TELEPHONE ENCOUNTER
Called patient and I rescheduled her Botox to tomorrow 01/11/22 at 09:45am in the  with Angela Bazzi,   Patient's Botox is already in the fridge

## 2022-01-11 ENCOUNTER — PROCEDURE VISIT (OUTPATIENT)
Dept: NEUROLOGY | Facility: CLINIC | Age: 32
End: 2022-01-11
Payer: COMMERCIAL

## 2022-01-11 VITALS — HEART RATE: 104 BPM | DIASTOLIC BLOOD PRESSURE: 76 MMHG | SYSTOLIC BLOOD PRESSURE: 143 MMHG | TEMPERATURE: 98.1 F

## 2022-01-11 DIAGNOSIS — G43.709 CHRONIC MIGRAINE WITHOUT AURA WITHOUT STATUS MIGRAINOSUS, NOT INTRACTABLE: Primary | ICD-10-CM

## 2022-01-11 PROCEDURE — 64615 CHEMODENERV MUSC MIGRAINE: CPT | Performed by: PHYSICIAN ASSISTANT

## 2022-01-11 NOTE — PROGRESS NOTES
Universal Protocol   Consent: Verbal consent obtained  Written consent obtained  Risks and benefits: risks, benefits and alternatives were discussed  Consent given by: patient  Time out: Immediately prior to procedure a "time out" was called to verify the correct patient, procedure, equipment, support staff and site/side marked as required  Patient understanding: patient states understanding of the procedure being performed  Patient consent: the patient's understanding of the procedure matches consent given  Procedure consent: procedure consent matches procedure scheduled  Relevant documents: relevant documents present and verified  Required items: required blood products, implants, devices, and special equipment available  Patient identity confirmed: verbally with patient        Chemodenervation     Date/Time 1/11/2022 9:52 AM     Performed by  Anjel Bergeron PA-C     Authorized by Anjel Bergeron PA-C        Pre-procedure details      Preparation: Patient was prepped and draped in usual sterile fashion     Anesthesia  (see MAR for exact dosages): Anesthesia method:  None   Procedure details     Position:  Upright   Botox     Brand:  Botox    mL's of Botulinum Toxin:  100    Final Concentration per CC:  50 units    Needle Gauge:  30 G 2 5 inch   Procedures     Botox Procedures: chronic headache      Indications: migraines     Injection Location      Head / Face:  L frontalis, R frontalis and L temporalis    L lateral frontalis:  45 unit(s)    R lateral frontalis:  10 unit(s)    Comments:  Back of left scar    R medial frontalis:  5 unit(s)    L temporalis injection amount:  40 unit(s)    Comments:  Around scar tissue   Total Units     Total units used:  100    Total units discarded:  0   Post-procedure details      Chemodenervation:  Chronic migraine    Facial Nerve Location[de-identified]  Bilateral facial nerve    Patient tolerance of procedure:   Tolerated well, no immediate complications   Comments      All medically necessary

## 2022-02-18 DIAGNOSIS — G43.109 MIGRAINE WITH AURA AND WITHOUT STATUS MIGRAINOSUS, NOT INTRACTABLE: ICD-10-CM

## 2022-02-18 DIAGNOSIS — G51.8 FACIAL NEURALGIA: ICD-10-CM

## 2022-02-18 RX ORDER — ONABOTULINUMTOXINA 100 [USP'U]/1
INJECTION, POWDER, LYOPHILIZED, FOR SOLUTION INTRADERMAL; INTRAMUSCULAR
Qty: 1 EACH | Refills: 0 | Status: SHIPPED | OUTPATIENT
Start: 2022-02-18

## 2022-05-05 ENCOUNTER — PROCEDURE VISIT (OUTPATIENT)
Dept: NEUROLOGY | Facility: CLINIC | Age: 32
End: 2022-05-05
Payer: COMMERCIAL

## 2022-05-05 VITALS — TEMPERATURE: 98 F | HEART RATE: 103 BPM | SYSTOLIC BLOOD PRESSURE: 132 MMHG | DIASTOLIC BLOOD PRESSURE: 62 MMHG

## 2022-05-05 DIAGNOSIS — G43.709 CHRONIC MIGRAINE WITHOUT AURA WITHOUT STATUS MIGRAINOSUS, NOT INTRACTABLE: Primary | ICD-10-CM

## 2022-05-05 PROCEDURE — 64615 CHEMODENERV MUSC MIGRAINE: CPT | Performed by: PHYSICIAN ASSISTANT

## 2022-05-05 NOTE — PROGRESS NOTES
Universal Protocol   Consent: Verbal consent obtained  Written consent obtained  Risks and benefits: risks, benefits and alternatives were discussed  Consent given by: patient  Time out: Immediately prior to procedure a "time out" was called to verify the correct patient, procedure, equipment, support staff and site/side marked as required  Patient understanding: patient states understanding of the procedure being performed  Patient consent: the patient's understanding of the procedure matches consent given  Procedure consent: procedure consent matches procedure scheduled  Relevant documents: relevant documents present and verified  Patient identity confirmed: verbally with patient        Chemodenervation     Date/Time 5/5/2022 3:07 PM     Performed by  Kole Dumont PA-C     Authorized by Kole Dumont PA-C        Pre-procedure details      Preparation: Patient was prepped and draped in usual sterile fashion     Procedure details     Position:  Upright   Botox     Botox Type:  Type A    Brand:  Botox    mL's of Botulinum Toxin:  100    mL's of preservative free sterile saline:  2    Final Concentration per CC:  50 units    Needle Gauge:  30 G 2 5 inch   Procedures     Botox Procedures: chronic headache      Indications: migraines     Injection Location      Head / Face:  R frontalis, L frontalis and L temporalis    L lateral frontalis:  45 unit(s)    R lateral frontalis:  10 unit(s)    Comments:  Around scar tissue    R medial frontalis:  5 unit(s)    L temporalis injection amount:  40 unit(s)    Comments:  Around scar tissue   Total Units     Total units used:  100    Total units discarded:  0   Post-procedure details      Chemodenervation:  Chronic migraine    Facial Nerve Location[de-identified]  Bilateral facial nerve    Patient tolerance of procedure:   Tolerated well, no immediate complications   Comments      All medically necessary

## 2022-07-29 ENCOUNTER — TELEPHONE (OUTPATIENT)
Dept: NEUROLOGY | Facility: CLINIC | Age: 32
End: 2022-07-29

## 2022-07-29 ENCOUNTER — TELEMEDICINE (OUTPATIENT)
Dept: NEUROLOGY | Facility: CLINIC | Age: 32
End: 2022-07-29
Payer: COMMERCIAL

## 2022-07-29 DIAGNOSIS — G43.709 CHRONIC MIGRAINE WITHOUT AURA WITHOUT STATUS MIGRAINOSUS, NOT INTRACTABLE: ICD-10-CM

## 2022-07-29 DIAGNOSIS — G43.109 MIGRAINE WITH AURA AND WITHOUT STATUS MIGRAINOSUS, NOT INTRACTABLE: Primary | ICD-10-CM

## 2022-07-29 PROCEDURE — 99214 OFFICE O/P EST MOD 30 MIN: CPT | Performed by: PHYSICIAN ASSISTANT

## 2022-07-29 NOTE — PROGRESS NOTES
Virtual Regular Visit    Verification of patient location:    Patient is located in the following state in which I hold an active license [place]      Assessment/Plan:    Problem List Items Addressed This Visit        Cardiovascular and Mediastinum    Chronic migraine without aura without status migrainosus, not intractable     Continue Botox every 3 months as has dramatically reduced her migraine headaches and daily had pain  Use Advil at onset of migraine if needed  If this fails we can use Nurtec, Ubrelvy or eval   This was discussed with patient  She will call if she wishes to try         Migraine with aura - Primary               Reason for visit is   Chief Complaint   Patient presents with    Migraine    Virtual Regular Visit        Encounter provider Hernan Junior PA-C    Provider located at [address]  [phone]      Recent Visits  No visits were found meeting these conditions  Showing recent visits within past 7 days and meeting all other requirements  Today's Visits  Date Type Provider Dept   07/29/22 Telephone Elyfrancine [facility], [address]    07/29/22 [address] [facility] [address] [place] [address] today's visits and meeting all other requirements  Future Appointments  No visits were found meeting these conditions  Showing future appointments within next 150 days and meeting all other requirements       The patient was identified by name and date of birth  Maryanne Thomas was informed that this is a telemedicine visit and that the visit is being conducted through [address] [place] [address] and patient was informed this is a secure, HIPAA-complaint platform  She agrees to proceed     My office door was closed  No one else was in the room  She acknowledged consent and understanding of privacy and security of the video platform   The patient has agreed to participate and understands they can discontinue the visit at any time  Patient is aware this is a billable service  Subjective  Camilo Frias is a 28 y  o right handed female with history of prior left aneurysm clipping in 2014 who presents for neurologic follow-up for chronic migraines   Patient is a manager of a web development team    Patient's headaches are better with Botox-prior to botox she would have daily pain on the left side of her head  With botox she has pretty much complete relief  She has noticed that she start with some discomfort about a week or so before her botox is due but this isn't all the time  Young Novoa has chronic nerve pain along the scar line  At this time, Headaches well controlled, patient not describing having any significant issues   The sensitivity around her scar side well controlled        Sleep disturbance: chronic issue     Current medical illnesses:  QTc 2/26/2015 438 ms  History Tobacco use: quit  Pituitary adenoma  Left carotid aneurysm  Cerebral aneurysm  Depression  ADHD  Vitamin-D deficiency    What medications do you take or have you taken for your headaches? Current Preventive:   Botox  Adderall  Vitamin D3  Current Abortive:   advil   Unable to use triptans due the anenursm, and clip    Prior Preventive:   Nadara Yahairaer are  Verapamil  Cyclobenzaprine  Vitamin B2, magnesium  Gabapentin (caused diplopia)  Prior Abortive:   Aleve, Advil, Toradol     What is your current pain level ? 0/10  How often do the headaches occur? 0-1 when has new botox but as wears off gets a few the last few week, this can vary    Are you ever headache free? yes    Headache history with updates: Alternative therapies used in the past for headaches?   None  Headache are worse if the patient:  None  Headache triggers:  Computer screen legs, bright or artificial light    Aura/warning and how long does it last ? Yes, in the past with her previous birth control she was see a prism  What time of the day do the headaches start? Varies  How long do the headaches last?  A few hours with Advil  Describe your usual headache ? Sharp, throbbing, pounding  Where is your headache located? Left frontal and left temple    What is the intensity of pain? Average pain 3-4/10 can go up to a 6/10    Associated symptoms:   Decrease of appetite, nausea  Photophobia, sensitivity to smell   Problem with concentration  prefer to be alone and in a dark room, unable to work    Number of days missed per month because of headaches:  Work (or school) days: none  Social or Family activities: rare    What time of the year do headaches occur more frequently? do not seem to be related to any time of the year  Have you seen someone else for headaches or pain? Yes, PCP  Have you had trigger point injection performed and how often? No  Have you had Botox injection performed and how often? Yes   Have you had epidural injections or transforaminal injections performed? No    Have you used CBD or THC for your headaches and how often? Yes, has medical card    Are you current pregnant or planning on getting pregnant? No, not at this time    Have you ever had any Brain imaging? yes     4/28/2015 MRI Brain  Stable treatment related changes status post right ICA bifurcation   aneurysm clipping , No acute infarction, intracranial hemorrhage or enhancing mass   lesion  Stable cystic pituitary microadenoma versus Rathke's cleft cyst   superiorly in the right side of the anterior lobe of the pituitary   gland, unchanged  I personally reviewed these images  Reviewed old notes from physician seen in the past- see above HPI for summary of previous encounters        With botox has had a reduction of at least 7 migraine days with less abortive medication, less ER visits which correlates to headache diary    Past Medical History:   Diagnosis Date    Depression     FH: brain aneurysm     Herpes     Migraine        Past Surgical History:   Procedure Laterality Date    CEREBRAL ANEURYSM REPAIR      CHOLECYSTECTOMY         Current Outpatient Medications   Medication Sig Dispense Refill    amphetamine-dextroamphetamine (ADDERALL XR) 10 MG 24 hr capsule Take one capsule in the AM (BRAND NAME)      Botox 100 units INJECT UP  UNITS IN THE MUSCLE INTO VARIOUS SITES OF THE HEAD AND NECK ONCE EVERY 3 MONTHS 1 each 0    cholecalciferol (VITAMIN D3) 1,000 units tablet Take 1,000 Units by mouth daily      Doxepin HCl 6 MG TABS Take 6 mg by mouth in the morning  0    levonorgestrel (MIRENA) 20 MCG/24HR IUD by Intrauterine route      valACYclovir (VALTREX) 1,000 mg tablet Take one tablet/day, as needed      ALPRAZolam ER (XANAX XR) 0 5 MG 24 hr tablet Take 0 5 mg by mouth daily  (Patient not taking: No sig reported)  1     No current facility-administered medications for this visit  Allergies   Allergen Reactions    Cobalt     Codeine     Edetic Acid     Ethylenediamine     Mercaptopurine      MERCAPTO MIX    Thimerosal     Nickel Itching and Rash      I have reviewed the patient's medical, social and surgical history as well as medications in detail and updated the computerized patient record  Review of Systems   Constitutional: Negative  HENT: Negative  Eyes: Negative  Respiratory: Negative  Cardiovascular: Negative  Gastrointestinal: Negative  Endocrine: Negative  Genitourinary: Negative  Musculoskeletal: Negative  Skin: Negative  Allergic/Immunologic: Negative  Neurological: Positive for headaches  Hematological: Negative  Psychiatric/Behavioral: Negative  I personally reviewed and updated the ROS that was entered by the medical assistant      Video Exam    There were no vitals filed for this visit  Physical Exam   CONSTITUTIONAL: Well developed, well nourished, well groomed  No dysmorphic features  Eyes:  EOM normal      Neck:  Normal ROM, neck supple  HEENT:  Normocephalic atraumatic      Chest: Respirations regular and unlabored  Psychiatric:  Normal behavior and appropriate affect      MENTAL STATUS  Orientation: Alert and oriented x 3  Fund of knowledge: Intact  I spent 31 minutes in total time for this visit    VIRTUAL VISIT [address] Surinder verbally agrees to participate in [employer]  Pt is aware that [employer] could be limited without vital signs or the ability to perform a full hands-on physical exam  Susan Leal understands she or the provider may request at any time to terminate the video visit and request the patient to seek care or treatment in person

## 2022-07-29 NOTE — PROGRESS NOTES
[address] [facility]  PATIENT:  Maryanne Thomas  MRN:  [id number]  :  1990  DATE OF SERVICE:  2022      Assessment/Plan:     No problem-specific Assessment & Plan notes found for this encounter  {Assess/PlanSmartLinks:98022}        History of Present Illness: We had the pleasure of evaluating Maryanne Thomas in neurological follow up  today for headaches  As you know,  she is a 28 y o   right handed female  with history of prior left aneurysm clipping in  who presents for neurologic follow-up for chronic migraines       Patient's headaches are better with Botox-prior to botox she would have daily pain on the left side of her head  With botox she has pretty much complete relief  She has noticed that she start with some discomfort about a week or so before her botox is due but this isn't all the time  Melody Marie has chronic nerve pain along the scar line  At this time, Headaches well controlled, patient not describing having any significant issues   The sensitivity around her scar side well controlled        Sleep disturbance: This has been an issue her whole life  Is currently on SIlenor-this is prescribed by PCP  Lack of sleep can trigger migraines, but this medication has worked the best for her  Had sleep study in high school told does not have sleep apnea but did not see sleep specialist  Referred to sleep center told has poor sleep hygiene        Current medical illnesses:  QTc  History Tobacco use:    What medications do you take or have you taken for your headaches? Current Preventive:   Botox  Adderall  Vitamin D3  Current Abortive:   ***     Prior Preventive:   Bernlele Fitzgerald are  Verapamil  Cyclobenzaprine  Vitamin B2, magnesium  Gabapentin (caused diplopia)  Prior Abortive:   Aleve, Advil, Toradol     What is your current pain level ? ***  How often do the headaches occur? Once every few weeks    Are you ever headache free? yes    Headache history with updates:   Alternative therapies used in the past for headaches? None  Headache are worse if the patient:  None  Headache triggers:  Computer screen legs, bright or artificial lytes    Aura/warning and how long does it last ? Yes, in the past with her previous birth control she was see a prism  What time of the day do the headaches start? Varies  How long do the headaches last?  A few hours with Advil  Describe your usual headache ? Sharp, throbbing, pounding  Where is your headache located? Left frontal and left temple    What is the intensity of pain? Average pain 3-4/10 can go up to a 6/10    Associated symptoms:   Decrease of appetite,   Photophobia, sensitivity to smell   Problem with concentration  prefer to be alone and in a dark room, unable to work    Number of days missed per month because of headaches:  Work (or school) days: ***  Social or Family activities: ***    What time of the year do headaches occur more frequently? {Headache time related:51059}  Have you seen someone else for headaches or pain? {YES /RL:98082}  Have you had trigger point injection performed and how often? {YES /IN:38249}  Have you had Botox injection performed and how often? {YES /AZ:05458}   Have you had epidural injections or transforaminal injections performed? {YES /OF:96474}    Have you used CBD or THC for your headaches and how often? {YES Q3957175    Are you current pregnant or planning on getting pregnant? {YES /DC:80622}    Have you ever had any Brain imaging? yes     4/28/2015 MRI Brain  Stable treatment related changes status post right ICA bifurcation   aneurysm clipping , No acute infarction, intracranial hemorrhage or enhancing mass   lesion  Stable cystic pituitary microadenoma versus Rathke's cleft cyst   superiorly in the right side of the anterior lobe of the pituitary   gland, unchanged  I personally reviewed these images      Reviewed old notes from physician seen in the past- see above HPI for summary of previous encounters  Past Medical History:   Diagnosis Date    Depression     FH: brain aneurysm     Herpes     Migraine        Patient Active Problem List   Diagnosis    Acne vulgaris    Attention deficit hyperactivity disorder (ADHD), predominantly inattentive type    Carotid aneurysm, left (HCC)    Cerebral aneurysm, nonruptured    Eczema    Facial neuralgia    Herpes simplex type 1 infection    [facility])    Hypersomnia with long sleep time, idiopathic    Migraine with aura    Mild episode of recurrent major depressive disorder ([facility] )    Pituitary adenoma ([facility] )    Rathke's pouch cyst ([facility] )    Vitamin D deficiency       Medications:      Current Outpatient Medications   Medication Sig Dispense Refill    ALPRAZolam ER (XANAX XR) 0 5 MG 24 hr tablet Take 0 5 mg by mouth daily  (Patient not taking: Reported on 8/5/2021)  1    amphetamine-dextroamphetamine (ADDERALL XR, 10MG,) 10 MG 24 hr capsule Take one capsule in the AM (BRAND NAME)      Botox 100 units INJECT UP  UNITS IN THE MUSCLE INTO VARIOUS SITES OF THE HEAD AND NECK ONCE EVERY 3 MONTHS 1 each 0    cholecalciferol (VITAMIN D3) 1,000 units tablet Take by mouth      levonorgestrel (MIRENA, 52 MG,) 20 MCG/24HR IUD by Intrauterine route      SILENOR 3 MG TABS TAKE 1 5 TABLETS EVERY NIGHT  0    valACYclovir (VALTREX) 1,000 mg tablet Take one tablet/day, as needed       No current facility-administered medications for this visit  Allergies:       Allergies   Allergen Reactions    Cobalt     Codeine     Edetic Acid     Ethylenediamine     Mercaptopurine      MERCAPTO MIX    Thimerosal     Nickel Itching and Rash       Family History:     Family History   Problem Relation Age of Onset    Hypertension Mother    [place] Migraines Mother     Hypertension Father     Diabetes Maternal Grandfather     Skin cancer Maternal Grandfather     Hypertension Paternal Grandfather        Social History:     Social History Socioeconomic History    Marital status: Single     Spouse name: Not on file    Number of children: Not on file    Years of education: Not on file    Highest education level: Not on file   Occupational History    Not on file   Tobacco Use    Smoking status: Former Smoker    Smokeless tobacco: Never Used   Vaping Use    Vaping Use: Never used   Substance and Sexual Activity    Alcohol use: Yes     Comment: socially     Drug use: No    Sexual activity: Yes     Partners: Male     Birth control/protection: I U D  Comment: Mirena 3/2015   Other Topics Concern    Not on file   Social History Narrative    Not on file     Social Determinants of Health     Financial Resource Strain: Not on file   Food Insecurity: Not on file   Transportation Needs: Not on file   Physical Activity: Not on file   Stress: Not on file   Social Connections: Not on file   Intimate Partner Violence: Not on file   Housing Stability: Not on file         Objective:   Physical Exam:                                                                   Vitals: There were no vitals taken for this visit  BP Readings from Last 3 Encounters:   05/05/22 132/62   01/11/22 143/76   08/31/21 138/75     Pulse Readings from Last 3 Encounters:   05/05/22 103   01/11/22 104   08/31/21 (!) 109                 Review of Systems:   Review of Systems    I personally reviewed the ROS entered by the MA    I spent *** minutes in face-to-face discussion regarding  the pathophysiology of {Desc; his/her:70516} current symptoms and further plan, as well as counseling, educating, and coordinating the patient's care including {COUNSELING REASONS FOR TIME BASED DOCUMENTATION:65873} and spent *** minutes non-face to face    Author:  Shmuel Johnson PA-C 7/29/2022 7:22 AM

## 2022-07-29 NOTE — ASSESSMENT & PLAN NOTE
Continue Botox every 3 months as has dramatically reduced her migraine headaches and daily had pain  Use Advil at onset of migraine if needed  If this fails we can use Nurtec, Ubrelvy or eval   This was discussed with patient    She will call if she wishes to try

## 2022-07-29 NOTE — TELEPHONE ENCOUNTER
Called patient and I left a message to call back to get her chart ready for her virtual video visit today with Annelise Lizama

## 2022-08-31 ENCOUNTER — TELEPHONE (OUTPATIENT)
Dept: NEUROLOGY | Facility: CLINIC | Age: 32
End: 2022-08-31

## 2022-08-31 NOTE — TELEPHONE ENCOUNTER
[facility] for:     Botox-100 units, [id number]  Pending-Auth# [id number]      Awaiting approval/denial response  [id number] for CPT: 69168     Will follow up on the status of pending authorization requested

## 2022-09-01 ENCOUNTER — TELEPHONE (OUTPATIENT)
Dept: NEUROLOGY | Facility: CLINIC | Age: 32
End: 2022-09-01

## 2022-09-01 NOTE — TELEPHONE ENCOUNTER
Received the following authorization approval info via fax from [facility]:    [id number] for CPT: 95016    Approved:   Botox-100 units   Auth# [id number]   Valid- 8/31/2022 until 8/31/2023  4 visits    Please use [facility]

## 2022-09-01 NOTE — TELEPHONE ENCOUNTER
Called [facility] and gave verbal RX to Jackeline Sanderson (pharmacist) for:     Botox-100 units  Qty:1  David Echavarria did expedite this order to be processed STAT per my request     Called patient and made her aware that with her insurance plan she now has to get her Botox medication from [facility] and they are currently expediting her Botox order  Patient was fine with rescheduling her Botox appointment for 9/15/2022 @ 10:00am at the [facility] office  Will follow up on the status of this order

## 2022-09-09 NOTE — TELEPHONE ENCOUNTER
[address] and spoke with Minal regarding the delivery status of patients Botox medication  Winter informed me that patients account is being processed with the insurance review department and order is also pending patients consent to deliver Botox medication  Winter informed me that she did send an account rescue message requesting this be expedited as STAT and there should be a status on patients Botox order/delivery within the next 24-48 Hrs  Will follow up regarding the delivery status of this order on Monday 9/12/2022

## 2022-09-13 NOTE — TELEPHONE ENCOUNTER
[address] and scheduled delivery with Gayatri Salamanca  for:    Botox-100 units  Qty:1  Delivery to [facility]  Scheduled for 9/14/2022  Via: [employer]    Please advise if medication doesn't arrive

## 2022-09-14 NOTE — TELEPHONE ENCOUNTER
Called [facility] and spoke to [facility] regarding patients Botox not being delivered  [facility] informed me that patients account was placed back into insurance review because it is showing this patient has active pharmacy benefits  Due to [facility] refusing to bill patients Botox medication under patients medical Benefits I did tell [facility] to make sure she chancels this order and verified with [facility] that patients insurance will NOT be charged for Botox and we no longer need Botox delivered in the future  Patients Auth was approved under [id number]  Requested [facility] cancel order all together and not bill patient for this order at all     Call Ref is Michelle Lyons  9/14/2022@ 2:22pm

## 2022-09-15 ENCOUNTER — PROCEDURE VISIT (OUTPATIENT)
Dept: NEUROLOGY | Facility: CLINIC | Age: 32
End: 2022-09-15
Payer: COMMERCIAL

## 2022-09-15 VITALS — DIASTOLIC BLOOD PRESSURE: 82 MMHG | TEMPERATURE: 97.7 F | SYSTOLIC BLOOD PRESSURE: 143 MMHG | HEART RATE: 118 BPM

## 2022-09-15 DIAGNOSIS — G43.709 CHRONIC MIGRAINE WITHOUT AURA WITHOUT STATUS MIGRAINOSUS, NOT INTRACTABLE: Primary | ICD-10-CM

## 2022-09-15 PROCEDURE — 64615 CHEMODENERV MUSC MIGRAINE: CPT | Performed by: PHYSICIAN ASSISTANT

## 2022-09-15 NOTE — PROGRESS NOTES
Universal Protocol   Consent: Verbal consent obtained  Written consent obtained  Risks and benefits: risks, benefits and alternatives were discussed  Consent given by: patient  Time out: Immediately prior to procedure a "time out" was called to verify the correct patient, procedure, equipment, support staff and site/side marked as required  Patient understanding: patient states understanding of the procedure being performed  Patient consent: the patient's understanding of the procedure matches consent given  Procedure consent: procedure consent matches procedure scheduled  Relevant documents: relevant documents present and verified  Patient identity confirmed: verbally with patient        Chemodenervation     Date/Time 9/15/2022 10:17 AM     Performed by  Richmond Jones PA-C     Authorized by Richmond Jones PA-C        Pre-procedure details      Preparation: Patient was prepped and draped in usual sterile fashion     Anesthesia  (see MAR for exact dosages): Anesthesia method:  None   Procedure details     Position:  Upright   Botox     Brand:  Botox    mL's of Botulinum Toxin:  100    mL's of preservative free sterile saline:  2    Final Concentration per CC:  50 units    Needle Gauge:  30 G 2 5 inch   Procedures     Botox Procedures: chronic headache     Injection Location      Head / Face:  L frontalis, R frontalis and L temporalis    L lateral frontalis:  45 unit(s)    R lateral frontalis:  10 unit(s)    Comments:  Around scar    L medial frontalis:  0 unit(s)    R medial frontalis:  5 unit(s)    L temporalis injection amount:  40 unit(s)    Comments:  Around scar   Total Units     Total units used:  100    Total units discarded:  0   Post-procedure details      Chemodenervation:  Chronic migraine    Facial Nerve Location[de-identified]  Bilateral facial nerve    Patient tolerance of procedure:   Tolerated well, no immediate complications   Comments      All medically necessary

## 2023-01-06 ENCOUNTER — PROCEDURE VISIT (OUTPATIENT)
Dept: NEUROLOGY | Facility: CLINIC | Age: 33
End: 2023-01-06

## 2023-01-06 VITALS — DIASTOLIC BLOOD PRESSURE: 82 MMHG | TEMPERATURE: 97.8 F | HEART RATE: 97 BPM | SYSTOLIC BLOOD PRESSURE: 144 MMHG

## 2023-01-06 DIAGNOSIS — G43.709 CHRONIC MIGRAINE WITHOUT AURA WITHOUT STATUS MIGRAINOSUS, NOT INTRACTABLE: Primary | ICD-10-CM

## 2023-05-05 ENCOUNTER — PROCEDURE VISIT (OUTPATIENT)
Dept: NEUROLOGY | Facility: CLINIC | Age: 33
End: 2023-05-05

## 2023-05-05 VITALS — SYSTOLIC BLOOD PRESSURE: 162 MMHG | DIASTOLIC BLOOD PRESSURE: 78 MMHG | HEART RATE: 99 BPM | TEMPERATURE: 98.1 F

## 2023-05-05 DIAGNOSIS — G43.709 CHRONIC MIGRAINE WITHOUT AURA WITHOUT STATUS MIGRAINOSUS, NOT INTRACTABLE: Primary | ICD-10-CM

## 2023-05-05 NOTE — PROGRESS NOTES
"  Universal Protocol   Consent: Verbal consent obtained  Written consent obtained  Risks and benefits: risks, benefits and alternatives were discussed  Consent given by: patient  Time out: Immediately prior to procedure a \"time out\" was called to verify the correct patient, procedure, equipment, support staff and site/side marked as required  Patient understanding: patient states understanding of the procedure being performed  Patient consent: the patient's understanding of the procedure matches consent given  Procedure consent: procedure consent matches procedure scheduled  Relevant documents: relevant documents present and verified  Patient identity confirmed: verbally with patient        Chemodenervation     Date/Time 5/5/2023 3:00 PM     Performed by  Tammy Forrest PA-C     Authorized by Tammy Forrest PA-C        Pre-procedure details      Prepped With: Alcohol     Anesthesia  (see MAR for exact dosages): Anesthesia method:  None   Procedure details     Position:  Upright   Botox     Botox Type:  Type A    Brand:  Botox    mL's of Botulinum Toxin:  100    Final Concentration per CC:  50 units and 100 units    Needle Gauge:  30 G 2 5 inch   Procedures     Botox Procedures: chronic headache      Indications: migraines     Injection Location      Head / Face:  L frontalis, R frontalis and L temporalis    L lateral frontalis:  45 unit(s)    R lateral frontalis:  10 unit(s)    Comments:  Around scar    L medial frontalis:  0 unit(s)    R medial frontalis:  5 unit(s)    Comments:  Around scar    L temporalis injection amount:  40 unit(s)    Comments:  Around scar   Total Units     Total units used:  100    Total units discarded:  0   Post-procedure details      Chemodenervation:  Chronic migraine    Facial Nerve Location[de-identified]  Bilateral facial nerve    Patient tolerance of procedure:   Tolerated well, no immediate complications   Comments      All medically necessary             "

## 2023-07-26 ENCOUNTER — OFFICE VISIT (OUTPATIENT)
Dept: NEUROLOGY | Facility: CLINIC | Age: 33
End: 2023-07-26

## 2023-07-26 VITALS
BODY MASS INDEX: 32.21 KG/M2 | HEART RATE: 97 BPM | HEIGHT: 67 IN | SYSTOLIC BLOOD PRESSURE: 120 MMHG | WEIGHT: 205.2 LBS | DIASTOLIC BLOOD PRESSURE: 80 MMHG

## 2023-07-26 DIAGNOSIS — G43.709 CHRONIC MIGRAINE WITHOUT AURA WITHOUT STATUS MIGRAINOSUS, NOT INTRACTABLE: Primary | ICD-10-CM

## 2023-07-26 NOTE — ASSESSMENT & PLAN NOTE
Continue Botox every 3 months as has dramatically reduced her migraine headaches and daily had pain  Use Advil at onset of migraine if needed  If this fails we can use Nurtec, Ubrelvy or eval.  This was discussed with patient.   She will call if she wishes to try

## 2023-07-26 NOTE — PROGRESS NOTES
Patient ID: Thomas Bueno is a 33 y.o. female. Assessment/Plan:    Chronic migraine without aura without status migrainosus, not intractable  Continue Botox every 3 months as has dramatically reduced her migraine headaches and daily had pain  Use Advil at onset of migraine if needed  If this fails we can use Nurtec, Ubrelvy or eval.  This was discussed with patient. She will call if she wishes to try       {Assess/PlanSmartLinks:27721}       Subjective:    HPI    {[facility] HPI texts:82389}    {Common ambulatory SmartLinks:35822}         Objective:    Blood pressure 120/80, pulse 97, height 5' 7" (1.702 m), weight 93.1 kg (205 lb 3.2 oz). Physical Exam    Neurological Exam      ROS:    Review of Systems   Constitutional: Negative for appetite change, fatigue and fever. HENT: Negative. Negative for hearing loss, tinnitus, trouble swallowing and voice change. Eyes: Negative. Negative for photophobia, pain and visual disturbance. Respiratory: Negative. Negative for shortness of breath. Cardiovascular: Negative. Negative for palpitations. Gastrointestinal: Negative. Negative for nausea and vomiting. Endocrine: Negative. Negative for cold intolerance. Genitourinary: Negative. Negative for dysuria, frequency and urgency. Musculoskeletal: Negative for back pain, gait problem, myalgias and neck pain. Skin: Negative. Negative for rash. Allergic/Immunologic: Negative. Neurological: Negative. Negative for dizziness, tremors, seizures, syncope, facial asymmetry, speech difficulty, weakness, light-headedness, numbness and headaches. Hematological: Bruises/bleeds easily (Bruises easily). Psychiatric/Behavioral: Positive for sleep disturbance. Negative for confusion and hallucinations.

## 2023-07-26 NOTE — PROGRESS NOTES
[facility]  PATIENT:  Victor Manuel Vasquez  MRN:  [id number]  :  1990  DATE OF SERVICE:  2023      Assessment/Plan:     Chronic migraine without aura without status migrainosus, not intractable  Continue Botox every 3 months as has dramatically reduced her migraine headaches and daily had pain  Use Advil at onset of migraine if needed  If this fails we can use Nurtec, Ubrelvy or eval.  This was discussed with patient. She will call if she wishes to try           Problem List Items Addressed This Visit        Cardiovascular and Mediastinum    Chronic migraine without aura without status migrainosus, not intractable - Primary     Continue Botox every 3 months as has dramatically reduced her migraine headaches and daily had pain  Use Advil at onset of migraine if needed  If this fails we can use Nurtec, Ubrelvy or eval.  This was discussed with patient. She will call if she wishes to try                History of Present Illness: We had the pleasure of evaluating Victor Manuel Vasquez in neurological follow up  today for headaches. As you know,  she is a 33 y.o.  right handed female. right handed female with history of prior left aneurysm clipping in  who presents for neurologic follow-up for chronic migraines.  Patient is a manager of a web development team     Patient's headaches are better with Botox-prior to botox she would have daily pain on the left side of her head. With botox she has pretty much complete relief. She has noticed that she start with some discomfort about a week or so before her botox is due but this isn't all the time.   She has chronic nerve pain along the scar line.  At this time, Headaches well controlled, patient not describing having any significant issues.  The sensitivity around her scar side well controlled.       Sleep disturbance: chronic issue but needs to take doxepin      Current medical illnesses:  QTc 2015 438 ms  History Tobacco use: quit  Pituitary adenoma  Left carotid aneurysm  Cerebral aneurysm  Depression  ADHD  Vitamin-D deficiency     What medications do you take or have you taken for your headaches? Current Preventive:   Botox  Adderall  Vitamin D3    Current Abortive:   advil   Unable to use triptans due the anenursm, and clip     Prior Preventive:   Markle Chi are  Verapamil  Cyclobenzaprine  Vitamin B2, magnesium  Gabapentin (caused diplopia)    Prior Abortive:   Aleve, Advil, Toradol      What is your current pain level ? 0/10  How often do the headaches occur?  has some mild to moderate annoying pain the week her botox is due otherwise very rare migraines    Are you ever headache free? yes     Headache history with updates: Alternative therapies used in the past for headaches? None  Headache are worse if the patient:  None  Headache triggers:  Computer screen legs, bright or artificial light     Aura/warning and how long does it last ? Yes, in the past with her previous birth control she was see a prism  What time of the day do the headaches start? Varies  How long do the headaches last?  A few hours with Advil  Describe your usual headache ? Sharp, throbbing, pounding  Where is your headache located? Left frontal and left temple     What is the intensity of pain? Average pain 4-5/10 but this is more rare, has not had an intense migraine with botox     Associated symptoms:   Decrease of appetite, nausea  Photophobia, sensitivity to smell   Problem with concentration  prefer to be alone and in a dark room, unable to work     Number of days missed per month because of headaches:  Work (or school) days: none  Social or Family activities: rare     What time of the year do headaches occur more frequently? do not seem to be related to any time of the year  Have you seen someone else for headaches or pain? Yes, PCP  Have you had trigger point injection performed and how often? No  Have you had Botox injection performed and how often? Yes   Have you had epidural injections or transforaminal injections performed? No     Have you used CBD or THC for your headaches and how often? Yes, has medical card     Are you current pregnant or planning on getting pregnant? No, not at this time     Have you ever had any Brain imaging? yes      4/28/2015 MRI Brain  Stable treatment related changes status post right ICA bifurcation   aneurysm clipping , No acute infarction, intracranial hemorrhage or enhancing mass   lesion. Stable cystic pituitary microadenoma versus Rathke's cleft cyst   superiorly in the right side of the anterior lobe of the pituitary   gland, unchanged.       I personally reviewed these images.     Reviewed old notes from physician seen in the past- see above HPI for summary of previous encounters.  .     With botox has had a reduction of at least 7 migraine days with less abortive medication, less ER visits which correlates to headache diary         Past Medical History:   Diagnosis Date   • Depression    • FH: brain aneurysm    • Herpes    • Migraine        Patient Active Problem List   Diagnosis   • Chronic migraine without aura without status migrainosus, not intractable   • Acne vulgaris   • Attention deficit hyperactivity disorder (ADHD), predominantly inattentive type   • Carotid aneurysm, left (HCC)   • Cerebral aneurysm, nonruptured   • Eczema   • Facial neuralgia   • Herpes simplex type 1 infection   • Hyperprolactinemia (HCC)   • Hypersomnia with long sleep time, idiopathic   • Migraine with aura   • Mild episode of recurrent major depressive disorder (HCC)   • Pituitary adenoma ([address])   • Rathke's pouch cyst ([address])   • Vitamin D deficiency       Medications:      Current Outpatient Medications   Medication Sig Dispense Refill   • amphetamine-dextroamphetamine (ADDERALL XR) 10 MG 24 hr capsule Take one capsule in the AM (BRAND NAME)     • Botox 100 units INJECT UP  UNITS IN THE MUSCLE INTO VARIOUS SITES OF THE HEAD AND NECK ONCE EVERY 3 MONTHS 1 each 0   • cholecalciferol (VITAMIN D3) 1,000 units tablet Take 1,000 Units by mouth daily     • Doxepin HCl 6 MG TABS Take 6 mg by mouth in the morning  0   • levonorgestrel (MIRENA) 20 MCG/24HR IUD by Intrauterine route     • valACYclovir (VALTREX) 1,000 mg tablet Take one tablet/day, as needed     • ALPRAZolam ER (XANAX XR) 0.5 MG 24 hr tablet Take 0.5 mg by mouth daily  (Patient not taking: No sig reported)  1     No current facility-administered medications for this visit. Allergies: Allergies   Allergen Reactions   • Cobalt    • Codeine    • Edetic Acid    • Ethylenediamine    • Mercaptopurine      MERCAPTO MIX   • Thimerosal    • Nickel Itching and Rash       Family History:     Family History   Problem Relation Age of Onset   • Hypertension Mother    • Migraines Mother    • Hypertension Father    • Diabetes Maternal Grandfather    • Skin cancer Maternal Grandfather    • Hypertension Paternal Grandfather        Social History:     Social History     Socioeconomic History   • Marital status: Single     Spouse name: Not on file   • Number of children: Not on file   • Years of education: Not on file   • Highest education level: Not on file   Occupational History   • Not on file   Tobacco Use   • Smoking status: Former   • Smokeless tobacco: Never   Vaping Use   • Vaping Use: Never used   Substance and Sexual Activity   • Alcohol use: Yes     Comment: socially    • Drug use: No   • Sexual activity: Yes     Partners: Male     Birth control/protection: I.U.D.      Comment: Mirena 3/2015   Other Topics Concern   • Not on file   Social History Narrative   • Not on file     Social Determinants of Health     Financial Resource Strain: Not on file   Food Insecurity: Not on file   Transportation Needs: Not on file   Physical Activity: Not on file   Stress: Not on file   Social Connections: Not on file   Intimate Partner Violence: Not on file   Housing Stability: Not on file      I have reviewed the patient's medical, social and surgical history as well as medications in detail and updated the computerized patient record. Objective:   Physical Exam:                                                                   Vitals:            /80 (BP Location: Left arm, Patient Position: Sitting, Cuff Size: Standard)   Pulse 97   Ht 5' 7" (1.702 m)   Wt 93.1 kg (205 lb 3.2 oz)   BMI 32.14 kg/m²   BP Readings from Last 3 Encounters:   07/26/23 120/80   05/05/23 162/78   01/06/23 144/82     Pulse Readings from Last 3 Encounters:   07/26/23 97   05/05/23 99   01/06/23 97          CONSTITUTIONAL: Well developed, well nourished, well groomed. No dysmorphic features. Eyes:  EOM normal      Neck:  Normal ROM, neck supple. HEENT:  Normocephalic atraumatic. Chest:  Respirations regular and unlabored. Psychiatric:  Normal behavior and appropriate affect      MENTAL STATUS  Orientation: Alert and oriented x 3  Fund of knowledge: Intact. MOTOR (Upper and lower extremities)   Bulk/tone/abnormal movement: Normal muscle bulk and tone. COORDINATION   Station/Gait: Normal baseline gait. Review of Systems:   Review of Systems  Constitutional: Negative for appetite change, fatigue and fever. HENT: Negative. Negative for hearing loss, tinnitus, trouble swallowing and voice change. Eyes: Negative. Negative for photophobia, pain and visual disturbance. Respiratory: Negative. Negative for shortness of breath. Cardiovascular: Negative. Negative for palpitations. Gastrointestinal: Negative. Negative for nausea and vomiting. Endocrine: Negative. Negative for cold intolerance. Genitourinary: Negative. Negative for dysuria, frequency and urgency. Musculoskeletal: Negative for back pain, gait problem, myalgias and neck pain. Skin: Negative. Negative for rash. Allergic/Immunologic: Negative. Neurological: Negative.   Negative for dizziness, tremors, seizures, syncope, facial asymmetry, speech difficulty, weakness, light-headedness, numbness and headaches. Hematological: Bruises/bleeds easily (Bruises easily). Psychiatric/Behavioral: Positive for sleep disturbance. Negative for confusion and hallucinations.    I personally reviewed the ROS entered by the MA      I have spent a total time of 24 minutes on 07/26/23 in caring for this patient including Prognosis, Risks and benefits of tx options, Instructions for management, Patient and family education, Risk factor reductions, Impressions, Counseling / Coordination of care, Documenting in the medical record, Reviewing / ordering tests, medicine, procedures   and Obtaining or reviewing history  .       Author:  Lucia Owens PA-C 7/26/2023 3:13 PM

## 2023-08-10 ENCOUNTER — TELEPHONE (OUTPATIENT)
Dept: NEUROLOGY | Facility: CLINIC | Age: 33
End: 2023-08-10

## 2023-08-10 NOTE — TELEPHONE ENCOUNTER
Retrieved pt approval from [facility]ayda[facility] for [facility]. Details below. Pt contacted to inform, unable to leave message for call back. Approved   Botox 100 UNITS  Qty. 1  Auth# PA Case ID: [id number]  Valid:7/11/2023-8/9/2024  Visits:  4    Please use [facility]

## 2023-08-10 NOTE — TELEPHONE ENCOUNTER
Pt current auth on file for Cap [employer] plan completed under buy and bill. Submitted new PA through pt's Pharm benefit through [facility] as this will be easier for the pt to fill at [address] . If denied will resubmit a new auth under pt plan (medical benefit) with use of specialty pharmacy [facility] as they will attempt the [place] pharm benefit for use before accepting the medical auth. Figueredo: Yaima Diaz- PA Case ID: [id number]  Need help? Call us at [phone]     Will await return of approval/denial determination. Do you authorize verbal/written order below for pt? Patient is a new start to Botox, as per requested for Botox you would like to start this patient on:     Botox 100 UNITS  Qty. 1  [id number]  Sig: Inject up to 100 UNITS I.M into the various sites in the head and neck once every three months for one year with  Refills: 3     If you agree to this order transcribed above please respond directly if you agree or not, so I may proceed further with authorization and for use of Verbal Order. Thank you.

## 2023-08-16 NOTE — TELEPHONE ENCOUNTER
Contacted [facility], spoke to [employer] (pharmacist), VO given as authorized in previous encounter 8/10/2023 6029. Will continue to follow up with [facility].

## 2023-08-17 ENCOUNTER — APPOINTMENT (OUTPATIENT)
Dept: LAB | Age: 33
End: 2023-08-17
Payer: COMMERCIAL

## 2023-08-17 DIAGNOSIS — Z00.00 ROUTINE GENERAL MEDICAL EXAMINATION AT A HEALTH CARE FACILITY: ICD-10-CM

## 2023-08-17 LAB
ALBUMIN SERPL BCP-MCNC: 3.8 G/DL (ref 3.5–5)
ALP SERPL-CCNC: 53 U/L (ref 46–116)
ALT SERPL W P-5'-P-CCNC: 46 U/L (ref 12–78)
ANION GAP SERPL CALCULATED.3IONS-SCNC: 4 MMOL/L
AST SERPL W P-5'-P-CCNC: 26 U/L (ref 5–45)
BASOPHILS # BLD AUTO: 0.07 THOUSANDS/ÂΜL (ref 0–0.1)
BASOPHILS NFR BLD AUTO: 1 % (ref 0–1)
BILIRUB SERPL-MCNC: 0.67 MG/DL (ref 0.2–1)
BUN SERPL-MCNC: 8 MG/DL (ref 5–25)
CALCIUM SERPL-MCNC: 9.5 MG/DL (ref 8.3–10.1)
CHLORIDE SERPL-SCNC: 107 MMOL/L (ref 96–108)
CHOLEST SERPL-MCNC: 209 MG/DL
CO2 SERPL-SCNC: 28 MMOL/L (ref 21–32)
CREAT SERPL-MCNC: 0.84 MG/DL (ref 0.6–1.3)
EOSINOPHIL # BLD AUTO: 0.18 THOUSAND/ÂΜL (ref 0–0.61)
EOSINOPHIL NFR BLD AUTO: 3 % (ref 0–6)
ERYTHROCYTE [DISTWIDTH] IN BLOOD BY AUTOMATED COUNT: 13.9 % (ref 11.6–15.1)
GFR SERPL CREATININE-BSD FRML MDRD: 92 ML/MIN/1.73SQ M
GLUCOSE P FAST SERPL-MCNC: 89 MG/DL (ref 65–99)
HCT VFR BLD AUTO: 41.8 % (ref 34.8–46.1)
HDLC SERPL-MCNC: 81 MG/DL
HGB BLD-MCNC: 13.2 G/DL (ref 11.5–15.4)
IMM GRANULOCYTES # BLD AUTO: 0.02 THOUSAND/UL (ref 0–0.2)
IMM GRANULOCYTES NFR BLD AUTO: 0 % (ref 0–2)
LDLC SERPL CALC-MCNC: 112 MG/DL (ref 0–100)
LYMPHOCYTES # BLD AUTO: 1.56 THOUSANDS/ÂΜL (ref 0.6–4.47)
LYMPHOCYTES NFR BLD AUTO: 24 % (ref 14–44)
MCH RBC QN AUTO: 29.7 PG (ref 26.8–34.3)
MCHC RBC AUTO-ENTMCNC: 31.6 G/DL (ref 31.4–37.4)
MCV RBC AUTO: 94 FL (ref 82–98)
MONOCYTES # BLD AUTO: 0.61 THOUSAND/ÂΜL (ref 0.17–1.22)
MONOCYTES NFR BLD AUTO: 9 % (ref 4–12)
NEUTROPHILS # BLD AUTO: 4.05 THOUSANDS/ÂΜL (ref 1.85–7.62)
NEUTS SEG NFR BLD AUTO: 63 % (ref 43–75)
NONHDLC SERPL-MCNC: 128 MG/DL
NRBC BLD AUTO-RTO: 0 /100 WBCS
PLATELET # BLD AUTO: 252 THOUSANDS/UL (ref 149–390)
PMV BLD AUTO: 8.3 FL (ref 8.9–12.7)
POTASSIUM SERPL-SCNC: 4.2 MMOL/L (ref 3.5–5.3)
PROT SERPL-MCNC: 7.4 G/DL (ref 6.4–8.4)
RBC # BLD AUTO: 4.45 MILLION/UL (ref 3.81–5.12)
SODIUM SERPL-SCNC: 139 MMOL/L (ref 135–147)
T4 FREE SERPL-MCNC: 0.62 NG/DL (ref 0.61–1.12)
TRIGL SERPL-MCNC: 82 MG/DL
TSH SERPL DL<=0.05 MIU/L-ACNC: 1.68 UIU/ML (ref 0.45–4.5)
WBC # BLD AUTO: 6.49 THOUSAND/UL (ref 4.31–10.16)

## 2023-08-17 PROCEDURE — 36415 COLL VENOUS BLD VENIPUNCTURE: CPT

## 2023-08-17 PROCEDURE — 84443 ASSAY THYROID STIM HORMONE: CPT

## 2023-08-17 PROCEDURE — 85025 COMPLETE CBC W/AUTO DIFF WBC: CPT

## 2023-08-17 PROCEDURE — 80053 COMPREHEN METABOLIC PANEL: CPT

## 2023-08-17 PROCEDURE — 80061 LIPID PANEL: CPT

## 2023-08-17 PROCEDURE — 84439 ASSAY OF FREE THYROXINE: CPT

## 2023-08-28 ENCOUNTER — TELEPHONE (OUTPATIENT)
Dept: NEUROLOGY | Facility: CLINIC | Age: 33
End: 2023-08-28

## 2023-08-28 NOTE — TELEPHONE ENCOUNTER
Received fax from [facility] with delivery for 8/29/2023, set up by patient. Botox 100 UNITS  Qty. 1  Scheduled:8/29/2023  Location:[facility]   Via: Ups/[employer]ex    Please advise if Botox does not arrive. Thank you.

## 2023-08-29 NOTE — TELEPHONE ENCOUNTER
Botox number of units: 100  Botox quantity: 1  Arrived at what location: [place]  Botox at Correct Administering Location: yes  NDC number: [id number]  Lot number: [id number]  Expiration Date: 11/01/2025  Appt notes indicate correct medication: yes

## 2023-09-08 ENCOUNTER — PROCEDURE VISIT (OUTPATIENT)
Dept: NEUROLOGY | Facility: CLINIC | Age: 33
End: 2023-09-08

## 2023-09-08 VITALS — HEART RATE: 99 BPM | DIASTOLIC BLOOD PRESSURE: 77 MMHG | TEMPERATURE: 98.7 F | SYSTOLIC BLOOD PRESSURE: 138 MMHG

## 2023-09-08 DIAGNOSIS — G43.709 CHRONIC MIGRAINE WITHOUT AURA WITHOUT STATUS MIGRAINOSUS, NOT INTRACTABLE: Primary | ICD-10-CM

## 2023-09-08 NOTE — PROGRESS NOTES
Universal Protocol   Consent: Verbal consent obtained. Written consent obtained. Risks and benefits: risks, benefits and alternatives were discussed  Consent given by: patient  Time out: Immediately prior to procedure a "time out" was called to verify the correct patient, procedure, equipment, support staff and site/side marked as required. Patient understanding: patient states understanding of the procedure being performed  Patient consent: the patient's understanding of the procedure matches consent given  Procedure consent: procedure consent matches procedure scheduled  Relevant documents: relevant documents present and verified  Patient identity confirmed: verbally with patient        Chemodenervation     Date/Time 9/8/2023 11:15 AM     Performed by  Steven Wilson PA-C   Authorized by Steven Wilson PA-C       Pre-procedure details      Preparation: Patient was prepped and draped in usual sterile fashion     Anesthesia  (see MAR for exact dosages): Anesthesia method:  None   Procedure details     Position:  Upright   Botox     Brand:  Botox    mL's of Botulinum Toxin:  100    mL's of preservative free sterile saline:  2    Final Concentration per CC:  50 units   Procedures     Botox Procedures: chronic headache      Indications: migraines     Injection Location      Head / Face:  L frontalis, R frontalis and L temporalis    L lateral frontalis:  45 unit(s)    R lateral frontalis:  10 unit(s)    Comments:  Around scar    L medial frontalis:  0 unit(s)    R medial frontalis:  5 unit(s)    Comments:  Around scar    L temporalis injection amount:  40 unit(s)    Comments:  Around scar   Total Units     Total units used:  100    Total units discarded:  0   Post-procedure details      Chemodenervation:  Chronic migraine    Facial Nerve Location[de-identified]  Bilateral facial nerve    Patient tolerance of procedure:   Tolerated well, no immediate complications   Comments      All medically necessary

## 2023-11-21 ENCOUNTER — TELEPHONE (OUTPATIENT)
Dept: NEUROLOGY | Facility: CLINIC | Age: 33
End: 2023-11-21

## 2023-11-21 NOTE — TELEPHONE ENCOUNTER
Contacted [facility] via chat, spoke to Rajesh Parra that advised Rajesh Parra  order completed. .... Bertha Hernandez scheduled order to deliver 11/29/23 to [facility] [address], [place], [place] [address]        Botox 200 UNITS  Qty. 1  Scheduled:11/29/2023  Location:[facility]   Via: [facility]ps/Fedex    Please advise if Botox does not arrive. Thank you.

## 2023-11-29 NOTE — TELEPHONE ENCOUNTER
Botox number of units: 100  Botox quantity: 1  Arrived at what location: [facility]  Botox at Correct Administering Location: yes  NDC number: 1682-5449-78  Lot number: [id number]  Expiration Date: 10/30/25  Appt notes indicate correct medication:no

## 2024-01-08 ENCOUNTER — PROCEDURE VISIT (OUTPATIENT)
Dept: NEUROLOGY | Facility: CLINIC | Age: 34
End: 2024-01-08
Payer: COMMERCIAL

## 2024-01-08 VITALS — TEMPERATURE: 98.6 F | DIASTOLIC BLOOD PRESSURE: 96 MMHG | HEART RATE: 88 BPM | SYSTOLIC BLOOD PRESSURE: 148 MMHG

## 2024-01-08 DIAGNOSIS — G43.709 CHRONIC MIGRAINE WITHOUT AURA WITHOUT STATUS MIGRAINOSUS, NOT INTRACTABLE: Primary | ICD-10-CM

## 2024-01-08 PROCEDURE — 64615 CHEMODENERV MUSC MIGRAINE: CPT | Performed by: PHYSICIAN ASSISTANT

## 2024-01-08 NOTE — PROGRESS NOTES
"Universal Protocol   Consent: Verbal consent obtained. Written consent obtained.  Risks and benefits: risks, benefits and alternatives were discussed  Consent given by: patient  Time out: Immediately prior to procedure a \"time out\" was called to verify the correct patient, procedure, equipment, support staff and site/side marked as required.  Patient understanding: patient states understanding of the procedure being performed  Patient consent: the patient's understanding of the procedure matches consent given  Procedure consent: procedure consent matches procedure scheduled  Relevant documents: relevant documents present and verified  Patient identity confirmed: verbally with patient      Chemodenervation     Date/Time  1/8/2024 1:30 PM     Performed by  Sharita Cordero PA-C   Authorized by  Sharita Cordero PA-C     Pre-procedure details      Preparation: Patient was prepped and draped in usual sterile fashion     Anesthesia  (see MAR for exact dosages):     Anesthesia method:  None   Botox      Botox Type:  Type B    Brand:  Botox    Final Concentration per CC:  100 units    Needle Gauge:  30 G 2.5 inch   Procedures      Botox Procedures: chronic headache     Injection Location      Head / Face:  R frontalis, L frontalis and L temporalis    L lateral frontalis:  45 unit(s)    R lateral frontalis:  10 unit(s)    Comments:  Around scar    L medial frontalis:  0 unit(s)    R medial frontalis:  5 unit(s)    Comments:  Around scar    L temporalis injection amount:  40 unit(s)    Comments:  Around scar   Post-procedure details      Chemodenervation:  Chronic migraine    Patient tolerance of procedure:  Tolerated well, no immediate complications   Comments       All medically necessary             "

## 2024-02-22 ENCOUNTER — TELEPHONE (OUTPATIENT)
Dept: NEUROLOGY | Facility: CLINIC | Age: 34
End: 2024-02-22

## 2024-02-22 NOTE — TELEPHONE ENCOUNTER
Spoke to Yas with [facility] that advised pt Botox is ready for delivery 2/27/2024.    Botox 100 UNITS  Qty.1  Scheduled:2/27/2024  Location:[facility]   Via: Ups/Fedex    Please advise if Botox does not arrive.    Thank you.  
diarrhea

## 2024-04-16 DIAGNOSIS — Z00.6 ENCOUNTER FOR EXAMINATION FOR NORMAL COMPARISON OR CONTROL IN CLINICAL RESEARCH PROGRAM: ICD-10-CM

## 2024-04-17 ENCOUNTER — APPOINTMENT (OUTPATIENT)
Dept: LAB | Facility: CLINIC | Age: 34
End: 2024-04-17

## 2024-04-17 ENCOUNTER — TELEPHONE (OUTPATIENT)
Dept: NEUROLOGY | Facility: CLINIC | Age: 34
End: 2024-04-17

## 2024-04-17 DIAGNOSIS — Z00.6 ENCOUNTER FOR EXAMINATION FOR NORMAL COMPARISON OR CONTROL IN CLINICAL RESEARCH PROGRAM: ICD-10-CM

## 2024-04-17 PROCEDURE — 36415 COLL VENOUS BLD VENIPUNCTURE: CPT

## 2024-04-17 NOTE — TELEPHONE ENCOUNTER
Contacted [facility], Spoke to Yaya that advised pt order can be rescheduled  for delivery 4/23/2024. [facility] ID#[id number] to locate pt, as rep could not find pt with pt information on file.     Botox 200 UNITS  Qty.1  Scheduled:4/23/2024  Location:[facility]   Via: Ups/Fedex    Please advise if Botox does not arrive.    Thank you.

## 2024-04-23 NOTE — TELEPHONE ENCOUNTER
Botox number of units: 100  Botox quantity: 1  Arrived at what location: [place]    Botox at Correct Administering Location: yes  NDC number: [id number]  Lot number: [id number]  Expiration Date: 4/30/2026  Appt notes indicate correct medication: yes

## 2024-04-30 ENCOUNTER — OFFICE VISIT (OUTPATIENT)
Dept: INTERNAL MEDICINE CLINIC | Facility: CLINIC | Age: 34
End: 2024-04-30
Payer: COMMERCIAL

## 2024-04-30 ENCOUNTER — APPOINTMENT (OUTPATIENT)
Dept: LAB | Facility: CLINIC | Age: 34
End: 2024-04-30
Payer: COMMERCIAL

## 2024-04-30 VITALS
HEIGHT: 67 IN | BODY MASS INDEX: 32.52 KG/M2 | WEIGHT: 207.2 LBS | DIASTOLIC BLOOD PRESSURE: 76 MMHG | SYSTOLIC BLOOD PRESSURE: 126 MMHG

## 2024-04-30 DIAGNOSIS — R07.9 CHEST PAIN, UNSPECIFIED TYPE: ICD-10-CM

## 2024-04-30 DIAGNOSIS — D35.2 PITUITARY ADENOMA (HCC): ICD-10-CM

## 2024-04-30 DIAGNOSIS — R07.9 CHEST PAIN, UNSPECIFIED TYPE: Primary | ICD-10-CM

## 2024-04-30 LAB
BASOPHILS # BLD AUTO: 0.06 THOUSANDS/ÂΜL (ref 0–0.1)
BASOPHILS NFR BLD AUTO: 1 % (ref 0–1)
D DIMER PPP FEU-MCNC: 0.27 UG/ML FEU
EOSINOPHIL # BLD AUTO: 0.07 THOUSAND/ÂΜL (ref 0–0.61)
EOSINOPHIL NFR BLD AUTO: 1 % (ref 0–6)
ERYTHROCYTE [DISTWIDTH] IN BLOOD BY AUTOMATED COUNT: 14.3 % (ref 11.6–15.1)
HCT VFR BLD AUTO: 41.3 % (ref 34.8–46.1)
HGB BLD-MCNC: 13.7 G/DL (ref 11.5–15.4)
IMM GRANULOCYTES # BLD AUTO: 0.03 THOUSAND/UL (ref 0–0.2)
IMM GRANULOCYTES NFR BLD AUTO: 1 % (ref 0–2)
LYMPHOCYTES # BLD AUTO: 1.39 THOUSANDS/ÂΜL (ref 0.6–4.47)
LYMPHOCYTES NFR BLD AUTO: 22 % (ref 14–44)
MCH RBC QN AUTO: 29.8 PG (ref 26.8–34.3)
MCHC RBC AUTO-ENTMCNC: 33.2 G/DL (ref 31.4–37.4)
MCV RBC AUTO: 90 FL (ref 82–98)
MONOCYTES # BLD AUTO: 0.5 THOUSAND/ÂΜL (ref 0.17–1.22)
MONOCYTES NFR BLD AUTO: 8 % (ref 4–12)
NEUTROPHILS # BLD AUTO: 4.22 THOUSANDS/ÂΜL (ref 1.85–7.62)
NEUTS SEG NFR BLD AUTO: 67 % (ref 43–75)
NRBC BLD AUTO-RTO: 0 /100 WBCS
PLATELET # BLD AUTO: 237 THOUSANDS/UL (ref 149–390)
PMV BLD AUTO: 7.9 FL (ref 8.9–12.7)
RBC # BLD AUTO: 4.6 MILLION/UL (ref 3.81–5.12)
WBC # BLD AUTO: 6.27 THOUSAND/UL (ref 4.31–10.16)

## 2024-04-30 PROCEDURE — 85379 FIBRIN DEGRADATION QUANT: CPT

## 2024-04-30 PROCEDURE — 93000 ELECTROCARDIOGRAM COMPLETE: CPT | Performed by: INTERNAL MEDICINE

## 2024-04-30 PROCEDURE — 85025 COMPLETE CBC W/AUTO DIFF WBC: CPT | Performed by: INTERNAL MEDICINE

## 2024-04-30 PROCEDURE — 99203 OFFICE O/P NEW LOW 30 MIN: CPT | Performed by: INTERNAL MEDICINE

## 2024-04-30 PROCEDURE — 3725F SCREEN DEPRESSION PERFORMED: CPT | Performed by: INTERNAL MEDICINE

## 2024-04-30 PROCEDURE — 36415 COLL VENOUS BLD VENIPUNCTURE: CPT

## 2024-04-30 PROCEDURE — 80053 COMPREHEN METABOLIC PANEL: CPT

## 2024-04-30 NOTE — PROGRESS NOTES
Name: Susan Love      : 1990      MRN: [id number]  Encounter Provider: Lea Reyes, MD  Encounter Date: 2024   [facility]    Assessment & Plan     1. Chest pain, unspecified type  -     CBC and differential  -     Comprehensive metabolic panel; Future  -     D-dimer, quantitative; Future  -     POCT ECG    2. Pituitary adenoma (HCC)  Assessment & Plan:  8mm , stable in MRI in 2015      Suspect musculoskeletal chest pain vs GERD  Low suspicion for PE  Obtain labs and if normal, discussed trial of famotidine       Subjective      Right sided chest pain for a few months and worse in the past few weeks  Sometimes starts in the back in the shoulder blade  Chest is not tender to touch  No known triggers  More comfortable to lay down on the left because it is harder to breathe when she lays on the right  No abdominal pain, nausea, vomiting  S/p get at 20yo  Concerned about a PE  due to family h/o PE. She is flying in a few days  Saw her PCP recently and dx with costochondritis  EKG was normal. Her blood work done in their office showed a slightly elevated liver enzyme likely from recent alcohol use  A week and a half ago, also experienced pain in the left side  when she took pantoprazole for 4-5 days   It helped with her lingering cough that started out from a URI      Chest Pain   Associated symptoms include shortness of breath. Pertinent negatives include no abdominal pain.     Review of Systems   Respiratory:  Positive for shortness of breath.    Cardiovascular:  Positive for chest pain.   Gastrointestinal:  Negative for abdominal pain, constipation and diarrhea.       Current Outpatient Medications on File Prior to Visit   Medication Sig   • amphetamine-dextroamphetamine (ADDERALL XR) 10 MG 24 hr capsule Take one capsule in the AM (BRAND NAME)   • Botox 100 units INJECT UP  UNITS IN THE MUSCLE INTO VARIOUS SITES OF THE HEAD AND NECK ONCE EVERY 3 MONTHS  "  • cholecalciferol (VITAMIN D3) 1,000 units tablet Take 1,000 Units by mouth daily   • Doxepin HCl 6 MG TABS Take 6 mg by mouth in the morning   • levonorgestrel (MIRENA) 20 MCG/24HR IUD by Intrauterine route   • valACYclovir (VALTREX) 1,000 mg tablet Take one tablet/day, as needed   • [DISCONTINUED] ALPRAZolam ER (XANAX XR) 0.5 MG 24 hr tablet Take 0.5 mg by mouth daily  (Patient not taking: No sig reported)       Objective     /76 (BP Location: Left arm, Patient Position: Sitting, Cuff Size: Large)   Ht 5' 7\" (1.702 m)   Wt 94 kg (207 lb 3.2 oz)   BMI 32.45 kg/m²     Physical Exam  Constitutional:       Appearance: She is well-developed. She is not ill-appearing.   Eyes:      Conjunctiva/sclera: Conjunctivae normal.   Cardiovascular:      Rate and Rhythm: Normal rate and regular rhythm.      Heart sounds: Normal heart sounds. No murmur heard.  Pulmonary:      Effort: Pulmonary effort is normal. No respiratory distress.      Breath sounds: Normal breath sounds. No wheezing or rales.   Chest:      Chest wall: No tenderness.   Abdominal:      General: There is no distension.      Palpations: Abdomen is soft. There is no mass.      Tenderness: There is no abdominal tenderness. There is no guarding or rebound.   Musculoskeletal:      Cervical back: Neck supple.      Right lower leg: No edema.      Left lower leg: No edema.   Neurological:      Mental Status: She is alert and oriented to person, place, and time.   Psychiatric:         Mood and Affect: Mood normal.         Behavior: Behavior normal.         Thought Content: Thought content normal.         Judgment: Judgment normal.       Lea Reyes, MD    "

## 2024-05-05 LAB
APOB+LDLR+PCSK9 GENE MUT ANL BLD/T: NOT DETECTED
BRCA1+BRCA2 DEL+DUP + FULL MUT ANL BLD/T: NOT DETECTED
MLH1+MSH2+MSH6+PMS2 GN DEL+DUP+FUL M: NOT DETECTED

## 2024-05-08 LAB
ALBUMIN SERPL BCP-MCNC: 4.3 G/DL (ref 3.5–5)
ALP SERPL-CCNC: 50 U/L (ref 34–104)
ALT SERPL W P-5'-P-CCNC: 44 U/L (ref 7–52)
ANION GAP SERPL CALCULATED.3IONS-SCNC: 6 MMOL/L (ref 4–13)
AST SERPL W P-5'-P-CCNC: 29 U/L (ref 13–39)
BILIRUB SERPL-MCNC: 0.5 MG/DL (ref 0.2–1)
BUN SERPL-MCNC: 13 MG/DL (ref 5–25)
CALCIUM SERPL-MCNC: 9.1 MG/DL (ref 8.4–10.2)
CHLORIDE SERPL-SCNC: 103 MMOL/L (ref 96–108)
CO2 SERPL-SCNC: 28 MMOL/L (ref 21–32)
CREAT SERPL-MCNC: 0.81 MG/DL (ref 0.6–1.3)
GFR SERPL CREATININE-BSD FRML MDRD: 96 ML/MIN/1.73SQ M
GLUCOSE SERPL-MCNC: 100 MG/DL (ref 65–140)
POTASSIUM SERPL-SCNC: 3.8 MMOL/L (ref 3.5–5.3)
PROT SERPL-MCNC: 7.5 G/DL (ref 6.4–8.4)
SODIUM SERPL-SCNC: 137 MMOL/L (ref 135–147)

## 2024-05-30 ENCOUNTER — TELEPHONE (OUTPATIENT)
Dept: NEUROLOGY | Facility: CLINIC | Age: 34
End: 2024-05-30

## 2024-05-30 NOTE — TELEPHONE ENCOUNTER
Patient rescheduling Botox due to sickness.  No availability within 1 week.  Please assist with rescheduling.  Botox Cancelled.

## 2024-06-04 NOTE — TELEPHONE ENCOUNTER
Called patient and I rescheduled her Botox to 06/25/24 at 10:30am in the [place] Location with Sharita.

## 2024-06-25 ENCOUNTER — PROCEDURE VISIT (OUTPATIENT)
Dept: NEUROLOGY | Facility: CLINIC | Age: 34
End: 2024-06-25
Payer: COMMERCIAL

## 2024-06-25 VITALS — TEMPERATURE: 98.9 F | HEART RATE: 101 BPM | SYSTOLIC BLOOD PRESSURE: 138 MMHG | DIASTOLIC BLOOD PRESSURE: 90 MMHG

## 2024-06-25 DIAGNOSIS — G43.709 CHRONIC MIGRAINE WITHOUT AURA WITHOUT STATUS MIGRAINOSUS, NOT INTRACTABLE: Primary | ICD-10-CM

## 2024-06-25 PROCEDURE — 64615 CHEMODENERV MUSC MIGRAINE: CPT | Performed by: PHYSICIAN ASSISTANT

## 2024-06-25 NOTE — PROGRESS NOTES
"Universal Protocol   Consent: Verbal consent obtained. Written consent obtained.  Risks and benefits: risks, benefits and alternatives were discussed  Consent given by: patient  Time out: Immediately prior to procedure a \"time out\" was called to verify the correct patient, procedure, equipment, support staff and site/side marked as required.  Patient understanding: patient states understanding of the procedure being performed  Patient consent: the patient's understanding of the procedure matches consent given  Procedure consent: procedure consent matches procedure scheduled  Relevant documents: relevant documents present and verified  Patient identity confirmed: verbally with patient      Chemodenervation     Date/Time  6/25/2024 10:30 AM     Performed by  Sharita Cordero PA-C   Authorized by  Sharita Cordero PA-C     Pre-procedure details      Preparation: Patient was prepped and draped in usual sterile fashion     Anesthesia  (see MAR for exact dosages):     Anesthesia method:  None   Botox      Botox Type:  Type A    Brand:  Botox    mL's of Botulinum Toxin:  100    mL's of preservative free sterile saline:  2    Final Concentration per CC:  50 units    Needle Gauge:  30 G 2.5 inch   Procedures      Botox Procedures: chronic headache     Injection Location      Head / Face:  R frontalis, L frontalis and L temporalis    L lateral frontalis:  45 unit(s)    R lateral frontalis:  10 unit(s)    Comments:  Around scar    L medial frontalis:  0 unit(s)    R medial frontalis:  5 unit(s)    Comments:  Around scar    L temporalis injection amount:  40 unit(s)    Comments:  Around scar   Total Units      Total units used:  100   Post-procedure details      Chemodenervation:  Chronic migraine    Facial Nerve Location::  Bilateral facial nerve    Patient tolerance of procedure:  Tolerated well, no immediate complications   Comments       All medically necessary             "

## 2024-07-29 ENCOUNTER — TELEMEDICINE (OUTPATIENT)
Dept: NEUROLOGY | Facility: CLINIC | Age: 34
End: 2024-07-29
Payer: COMMERCIAL

## 2024-07-29 DIAGNOSIS — G43.709 CHRONIC MIGRAINE WITHOUT AURA WITHOUT STATUS MIGRAINOSUS, NOT INTRACTABLE: Primary | ICD-10-CM

## 2024-07-29 PROCEDURE — 99212 OFFICE O/P EST SF 10 MIN: CPT | Performed by: PHYSICIAN ASSISTANT

## 2024-07-29 NOTE — ASSESSMENT & PLAN NOTE
Continue Botox every 4 months as has dramatically reduced her migraine headaches and daily had pain  Use Advil at onset of migraine if needed  If this fails we can use Nurtec, Ubrelvy or eval.  This was discussed with patient.  She will call if she wishes to try

## 2024-07-29 NOTE — PROGRESS NOTES
history of prior left aneurysm clipping in 2014 who presents for neurologic follow-up for chronic migraines.  Patient is a manager of a web development team     Patient's headaches are better with Botox-prior to botox she would have daily pain on the left side of her head. With botox she has pretty much complete relief. She has noticed that she start with some discomfort about a week or so before her botox is due but this isn't all the time.   She has chronic nerve pain along the scar line. At this time, Headaches well controlled, patient not describing having any significant issues.  The sensitivity around her scar side well controlled.       Sleep disturbance: chronic issue but needs to take doxepin      Current medical illnesses:  QTc 2/26/2015 438 ms  History Tobacco use: quit  Pituitary adenoma  Left carotid aneurysm  Cerebral aneurysm  Depression  ADHD  Vitamin-D deficiency     What medications do you take or have you taken for your headaches?   Current Preventive:   Botox  Adderall  Vitamin D3     Current Abortive:   advil   Unable to use triptans due the anenursm, and clip     Prior Preventive:   Belén are  Verapamil  Cyclobenzaprine  Vitamin B2, magnesium  Gabapentin (caused diplopia)     Prior Abortive:   Aleve, Advil, Toradol      What is your current pain level ? 0/10  How often do the headaches occur?  has some mild to moderate annoying pain the week her botox is due otherwise very rare migraines     Are you ever headache free? yes     Headache history with updates:  Alternative therapies used in the past for headaches?  None  Headache are worse if the patient:  None  Headache triggers:  Computer screen legs, bright or artificial light     Aura/warning and how long does it last ? Yes, in the past with her previous birth control she was see a prism  What time of the day do the headaches start?  Varies  How long do the headaches last?  A few hours with Advil  Describe your usual headache ?  Maryuri  throbbing, pounding  Where is your headache located?  Left frontal and left temple     What is the intensity of pain?  Average pain 4-5/10 but this is more rare, has not had an intense migraine with botox     Associated symptoms:   Decrease of appetite, nausea  Photophobia, sensitivity to smell   Problem with concentration  prefer to be alone and in a dark room, unable to work     Number of days missed per month because of headaches:  Work (or school) days: none  Social or Family activities: rare     What time of the year do headaches occur more frequently? do not seem to be related to any time of the year  Have you seen someone else for headaches or pain? Yes, PCP  Have you had trigger point injection performed and how often? No  Have you had Botox injection performed and how often? Yes   Have you had epidural injections or transforaminal injections performed? No     Have you used CBD or THC for your headaches and how often? Yes, has medical card     Are you current pregnant or planning on getting pregnant? No, not at this time     Have you ever had any Brain imaging? yes      4/28/2015 MRI Brain  Stable treatment related changes status post right ICA bifurcation   aneurysm clipping , No acute infarction, intracranial hemorrhage or enhancing mass   lesion. Stable cystic pituitary microadenoma versus Rathke's cleft cyst   superiorly in the right side of the anterior lobe of the pituitary   gland, unchanged.       I personally reviewed these images.     Reviewed old notes from physician seen in the past- see above HPI for summary of previous encounters. .     With botox has had a reduction of at least 7 migraine days with less abortive medication, less ER visits which correlates to headache diary

## 2024-07-29 NOTE — PROGRESS NOTES
Virtual Regular Visit  Name: Susan Love      : 1990      MRN: [id number]  Encounter Provider: Sharita Cordero PA-C  Encounter Date: 2024   Encounter department: NEUROLOGY [facility]    Verification of patient location:    Patient is located at Home in the following state in which I hold an active license [place]    Assessment & Plan   1. Chronic migraine without aura without status migrainosus, not intractable  Assessment & Plan:  Continue Botox every 4 months as has dramatically reduced her migraine headaches and daily had pain  Use Advil at onset of migraine if needed  If this fails we can use Nurtec, Ubrelvy or eval.  This was discussed with patient.  She will call if she wishes to try        Encounter provider Sharita Cordero PA-C    The patient was identified by name and date of birth. Susan Love was informed that this is a telemedicine visit and that the visit is being conducted through the Epic Embedded platform. She agrees to proceed..  My office door was closed. No one else was in the room.  She acknowledged consent and understanding of privacy and security of the video platform. The patient has agreed to participate and understands they can discontinue the visit at any time.    Patient is aware this is a billable service.     History of Present Illness     Susan Love is a 33 y.o. female who presents headache    history of prior left aneurysm clipping in  who presents for neurologic follow-up for chronic migraines.  Patient is a manager of a web development team     Patient's headaches are better with Botox-prior to botox she would have daily pain on the left side of her head. With botox she has pretty much complete relief. She has noticed that she start with some discomfort about a week or so before her botox is due but this isn't all the time.   She has chronic nerve pain along the scar line. At this time, Headaches well controlled, patient not describing having any  significant issues.  The sensitivity around her scar side well controlled.       Sleep disturbance: chronic issue but needs to take doxepin      Current medical illnesses:  QTc 2/26/2015 438 ms  History Tobacco use: quit  Pituitary adenoma  Left carotid aneurysm  Cerebral aneurysm  Depression  ADHD  Vitamin-D deficiency     What medications do you take or have you taken for your headaches?   Current Preventive:   Botox  Adderall  Vitamin D3     Current Abortive:   advil   Unable to use triptans due the anenursm, and clip     Prior Preventive:   Belén are  Verapamil  Cyclobenzaprine  Vitamin B2, magnesium  Gabapentin (caused diplopia)     Prior Abortive:   Aleve, Advil, Toradol      What is your current pain level ? 0/10  How often do the headaches occur?  has some mild to moderate annoying pain the week her botox is due otherwise very rare migraines     Are you ever headache free? yes     Headache history with updates:  Alternative therapies used in the past for headaches?  None  Headache are worse if the patient:  None  Headache triggers:  Computer screen legs, bright or artificial light     Aura/warning and how long does it last ? Yes, in the past with her previous birth control she was see a prism  What time of the day do the headaches start?  Varies  How long do the headaches last?  A few hours with Advil  Describe your usual headache ?  Sharp, throbbing, pounding  Where is your headache located?  Left frontal and left temple     What is the intensity of pain?  Average pain 4-5/10 but this is more rare, has not had an intense migraine with botox     Associated symptoms:   Decrease of appetite, nausea  Photophobia, sensitivity to smell   Problem with concentration  prefer to be alone and in a dark room, unable to work     Number of days missed per month because of headaches:  Work (or school) days: none  Social or Family activities: rare     What time of the year do headaches occur more frequently? do not seem  to be related to any time of the year  Have you seen someone else for headaches or pain? Yes, PCP  Have you had trigger point injection performed and how often? No  Have you had Botox injection performed and how often? Yes   Have you had epidural injections or transforaminal injections performed? No     Have you used CBD or THC for your headaches and how often? Yes, has medical card     Are you current pregnant or planning on getting pregnant? No, not at this time   Have you ever had any Brain imaging? yes      4/28/2015 MRI Brain  Stable treatment related changes status post right ICA bifurcation   aneurysm clipping , No acute infarction, intracranial hemorrhage or enhancing mass   lesion. Stable cystic pituitary microadenoma versus Rathke's cleft cyst   superiorly in the right side of the anterior lobe of the pituitary   gland, unchanged.       I personally reviewed these images.     Reviewed old notes from physician seen in the past- see above HPI for summary of previous encounters. .     With botox has had a reduction of at least 7 migraine days with less abortive medication, less ER visits which correlates to headache diary       Review of Systems   Constitutional: Negative.    HENT: Negative.     Eyes: Negative.    Respiratory: Negative.     Cardiovascular: Negative.    Gastrointestinal: Negative.    Endocrine: Negative.    Genitourinary: Negative.    Musculoskeletal: Negative.    Skin: Negative.    Allergic/Immunologic: Negative.    Neurological:  Positive for headaches.   Hematological: Negative.    Psychiatric/Behavioral: Negative.     I personally reviewed and updated the ROS that was entered by the medical assistant      Objective     There were no vitals taken for this visit.  Physical Exam  CONSTITUTIONAL: Well developed, well nourished, well groomed. No dysmorphic features.     HEENT:  Normocephalic atraumatic.    Chest:  Respirations regular and unlabored.    Psychiatric:  Normal behavior and  appropriate affect      MENTAL STATUS  Orientation: Alert and oriented x 3  Fund of knowledge: Intact.    Visit Time  I have spent a total time of 19 minutes in caring for this patient on the day of the visit/encounter including Prognosis, Risks and benefits of tx options, Instructions for management, Patient and family education, Impressions, Counseling / Coordination of care, Documenting in the medical record, and Obtaining or reviewing history  .

## 2024-08-05 ENCOUNTER — PATIENT MESSAGE (OUTPATIENT)
Dept: NEUROLOGY | Facility: CLINIC | Age: 34
End: 2024-08-05

## 2024-08-05 DIAGNOSIS — G43.009 MIGRAINE WITHOUT AURA AND WITHOUT STATUS MIGRAINOSUS, NOT INTRACTABLE: Primary | ICD-10-CM

## 2024-08-05 RX ORDER — RIMEGEPANT SULFATE 75 MG/75MG
75 TABLET, ORALLY DISINTEGRATING ORAL AS NEEDED
Qty: 16 TABLET | Refills: 6 | Status: SHIPPED | OUTPATIENT
Start: 2024-08-05

## 2024-08-15 ENCOUNTER — TELEPHONE (OUTPATIENT)
Age: 34
End: 2024-08-15

## 2024-08-29 RX ORDER — DEXTROAMPHETAMINE/AMPHETAMINE 15 MG
15 CAPSULE, EXT RELEASE 24 HR ORAL EVERY MORNING
COMMUNITY
Start: 2024-08-08

## 2024-08-30 ENCOUNTER — OFFICE VISIT (OUTPATIENT)
Dept: INTERNAL MEDICINE CLINIC | Facility: CLINIC | Age: 34
End: 2024-08-30
Payer: COMMERCIAL

## 2024-08-30 ENCOUNTER — TELEPHONE (OUTPATIENT)
Age: 34
End: 2024-08-30

## 2024-08-30 VITALS
HEART RATE: 111 BPM | SYSTOLIC BLOOD PRESSURE: 140 MMHG | WEIGHT: 208.3 LBS | DIASTOLIC BLOOD PRESSURE: 78 MMHG | OXYGEN SATURATION: 99 % | BODY MASS INDEX: 32.69 KG/M2 | HEIGHT: 67 IN

## 2024-08-30 DIAGNOSIS — F33.0 MILD EPISODE OF RECURRENT MAJOR DEPRESSIVE DISORDER (HCC): ICD-10-CM

## 2024-08-30 DIAGNOSIS — E66.09 CLASS 1 OBESITY DUE TO EXCESS CALORIES WITHOUT SERIOUS COMORBIDITY WITH BODY MASS INDEX (BMI) OF 32.0 TO 32.9 IN ADULT: Primary | ICD-10-CM

## 2024-08-30 DIAGNOSIS — Z79.899 HIGH RISK MEDICATION USE: ICD-10-CM

## 2024-08-30 DIAGNOSIS — F90.0 ATTENTION DEFICIT HYPERACTIVITY DISORDER (ADHD), PREDOMINANTLY INATTENTIVE TYPE: ICD-10-CM

## 2024-08-30 PROBLEM — E66.811 CLASS 1 OBESITY DUE TO EXCESS CALORIES WITHOUT SERIOUS COMORBIDITY WITH BODY MASS INDEX (BMI) OF 32.0 TO 32.9 IN ADULT: Status: ACTIVE | Noted: 2024-08-30

## 2024-08-30 PROCEDURE — 99214 OFFICE O/P EST MOD 30 MIN: CPT | Performed by: INTERNAL MEDICINE

## 2024-08-30 RX ORDER — DEXAMETHASONE 1 MG
TABLET ORAL
Qty: 1 TABLET | Refills: 0 | Status: SHIPPED | OUTPATIENT
Start: 2024-08-30

## 2024-08-30 NOTE — TELEPHONE ENCOUNTER
"August 30, 2024  Susan Love   to [facility] (supporting Sharita Cordero PA-C)         8/30/24  3:19 PM  Hello! I'm following up on this since the order still says \"Insurance approval required.\" Thank you!  "

## 2024-08-30 NOTE — ASSESSMENT & PLAN NOTE
Depression Screening Follow-up Plan: Patient's depression screening was positive with a PHQ-2 score of . Their PHQ-9 score was 8. Patient's depressive symptoms likely due to other medical condition. Would recommend treatment of underlying condition. Will continue to monitor at next office visit.

## 2024-08-30 NOTE — PROGRESS NOTES
Ambulatory Visit  Name: Susan Love      : 1990      MRN: [id number]  Encounter Provider: Lea Reyes, MD  Encounter Date: 2024   [facility]: [facility]    Assessment & Plan   1. Class 1 obesity due to excess calories without serious comorbidity with body mass index (BMI) of 32.0 to 32.9 in adult  Assessment & Plan:  Obtain low dose DST prior to starting Wegovy  Orders:  -     Semaglutide-Weight Management (WEGOVY) 0.25 MG/0.5ML; Inject 0.5 mL (0.25 mg total) under the skin once a week  -     Cortisol Level, AM Specimen; Future  -     dexamethasone (DECADRON) 1 mg tablet; Take one dose at 11pm the night before the blood test  -     Lipid panel  2. Attention deficit hyperactivity disorder (ADHD), predominantly inattentive type  Assessment & Plan:  This was managed by previous PCP  Adderall XR 15mg daily and generic Adderall IR 10mg daily    Orders:  -     [facility] Prescribed Meds and Special Instructions  -     Amphetamines, Methamphetamines  -     Butalbital  -     Phenobarbital  -     Secobarbital  -     Alprazolam  -     Clonazepam  -     Diazepam  -     Lorazepam  -     Gabapentin  -     Pregabalin  -     Cocaine  -     Heroin  -     Buprenorphine  -     Levorphanol  -     Meperidine  -     Naltrexone  -     Fentanyl  -     Methadone  -     Oxycodone  -     Tapentadol  -     THC  -     Tramadol  -     Codeine, Hydrocodone, Hydropmorphone, Morphine  -     Bath Salts  -     Ethyl Glucuronide/Ethyl Sulfate  -     Kratom  -     Spice  -     Methylphenidate  -     Phentermine  -     Validity Oxidant  -     Validity Creatinine  -     Validity pH  -     Validity Specific  -     Xylazine Definitive Test  3. Mild episode of recurrent major depressive disorder (HCC)  Assessment & Plan:  Depression Screening Follow-up Plan: Patient's depression screening was positive with a PHQ-2 score of . Their PHQ-9 score was 8. Patient's depressive symptoms likely due to other medical  condition. Would recommend treatment of underlying condition. Will continue to monitor at next office visit.    4. High risk medication use  -     [facility] Prescribed Meds and Special Instructions  -     Amphetamines, Methamphetamines  -     Butalbital  -     Phenobarbital  -     Secobarbital  -     Alprazolam  -     Clonazepam  -     Diazepam  -     Lorazepam  -     Gabapentin  -     Pregabalin  -     Cocaine  -     Heroin  -     Buprenorphine  -     Levorphanol  -     Meperidine  -     Naltrexone  -     Fentanyl  -     Methadone  -     Oxycodone  -     Tapentadol  -     THC  -     Tramadol  -     Codeine, Hydrocodone, Hydropmorphone, Morphine  -     Bath Salts  -     Ethyl Glucuronide/Ethyl Sulfate  -     Kratom  -     Spice  -     Methylphenidate  -     Phentermine  -     Validity Oxidant  -     Validity Creatinine  -     Validity pH  -     Validity Specific  -     Xylazine Definitive Test         History of Present Illness     Here for a follow up  She was seen for right sided chest pain that responded to famotidine  This has resolved  Concerned about her weight and asking about starting Wegovy  She was able to lose 50lbs in the past through a healthy diet and regular exercise but has gained weight in the past 2-3 years  Trying to lose weight again with a healthy diet and regular exercise but unsuccessful  Tsh last year normal        Review of Systems   Constitutional:  Positive for unexpected weight change (weight gain).   Respiratory:  Negative for shortness of breath.    Cardiovascular:  Negative for chest pain and palpitations.   Gastrointestinal:  Negative for abdominal pain.   Neurological:  Positive for headaches.     Past Medical History:   Diagnosis Date   • Allergic    • Anxiety 2015    Saw a therapist for 1 year at the time   • Depression    • FH: brain aneurysm    • Headache(784.0) 2006    History of headaches; mainly controlled at this point   • Herpes    • Hyperprolactinemia (HCC) 12/28/2015    • Hypertension  (last)    Blood pressure goes through periods where it is high for a few months then comes back down   • Migraine    • Obesity      Past Surgical History:   Procedure Laterality Date   • CEREBRAL ANEURYSM REPAIR      22yo   • CHOLECYSTECTOMY      20yo   • EYE SURGERY  ?    Clogged tear duct     Family History   Problem Relation Age of Onset   • Hypertension Mother    • Migraines Mother    • Diabetes Mother    • Cancer Mother         Endometrial   • Hypertension Father    • Arthritis Father    • Mental illness Maternal Grandmother         Anxiety, phobias, depression   • Depression Maternal Grandmother    • Thrombosis Maternal Grandmother    • Autoimmune disease Maternal Grandmother         Lupus (likely cause of interstitial lung disease)   • Anxiety disorder Maternal Grandmother    • Diabetes Maternal Grandfather    • Skin cancer Maternal Grandfather    • Hypertension Maternal Grandfather    • Stroke Maternal Grandfather    • Heart disease Maternal Grandfather    • Cancer Maternal Grandfather         Skin cancer   • Alcohol abuse Paternal Grandmother    • Stroke Paternal Grandmother         Cause of death   • Thrombosis Paternal Grandmother         Unsure, but she was on blood thinners   • Hypertension Paternal Grandfather      Social History     Tobacco Use   • Smoking status: Former     Current packs/day: 0.00     Types: Cigarettes     Quit date: 2016     Years since quittin.6   • Smokeless tobacco: Never   • Tobacco comments:     Smoked about 1 pack per week for about 1-1.5 yr   Vaping Use   • Vaping status: Never Used   Substance and Sexual Activity   • Alcohol use: Yes     Comment: Socially / not consistent   • Drug use: No   • Sexual activity: Yes     Partners: Male     Birth control/protection: I.U.D.     Comment: Mirena 3/2015     Current Outpatient Medications on File Prior to Visit   Medication Sig   • ADDERALL XR, 15MG, 15 MG 24 hr capsule Take 15 mg by mouth every  "morning   • amphetamine-dextroamphetamine (ADDERALL XR) 10 MG 24 hr capsule Take 10 mg by mouth in the morning   • Botox 100 units INJECT UP  UNITS IN THE MUSCLE INTO VARIOUS SITES OF THE HEAD AND NECK ONCE EVERY 3 MONTHS   • cholecalciferol (VITAMIN D3) 1,000 units tablet Take 1,000 Units by mouth daily   • Doxepin HCl 6 MG TABS Take 6 mg by mouth in the morning   • levonorgestrel (MIRENA) 20 MCG/24HR IUD by Intrauterine route   • rimegepant sulfate (Nurtec) 75 mg TBDP Take 1 tablet (75 mg total) by mouth as needed (migraine) Limit of 1 in 24 hours   • valACYclovir (VALTREX) 1,000 mg tablet Take one tablet/day, as needed     Allergies   Allergen Reactions   • Cobalt    • Edetic Acid    • Ethylenediamine    • Mercaptopurine      MERCAPTO MIX   • Thimerosal (Thiomersal)    • Nickel Itching and Rash     Immunization History   Administered Date(s) Administered   • COVID-19 Moderna mRNA Vaccine 12 Yr+ 50 mcg/0.5 mL (Spikevax) 12/04/2023   • COVID-19 PFIZER VACCINE 0.3 ML IM 03/16/2021, 04/06/2021, 11/23/2021   • COVID-19 Pfizer Vac BIVALENT Real-sucrose 12 Yr+ IM 11/10/2022   • DTaP 01/22/1992, 08/24/1993, 01/13/1997   • DTaP 5 01/22/1992, 04/01/1992, 05/20/1992, 08/24/1993, 01/13/1997   • Hep B, Adolescent or Pediatric 03/01/1993, 04/05/1993   • Hep B, adult 03/01/1993, 04/05/1993, 10/11/1993   • HiB 04/01/1992   • Hib (PRP-OMP) 01/22/1992, 04/01/1992, 05/20/1992, 05/25/1993   • INFLUENZA 12/19/2015, 10/19/2016, 10/26/2017, 10/18/2018, 10/07/2019, 12/08/2020, 11/04/2022   • IPV 02/22/1992, 04/22/1992, 08/24/1993, 01/13/1997   • MMR 03/01/1993, 10/21/1997   • Measles 05/20/1992   • Meningococcal MCV4P 06/21/2010   • Meningococcal, Unknown Serogroups 06/21/2010   • Td (adult), adsorbed 05/26/2006   • Tuberculin Skin Test 06/21/2010   • Tuberculin Skin Test-PPD Intradermal 06/21/2010     Objective     /78 (BP Location: Right arm, Patient Position: Sitting, Cuff Size: Large)   Pulse (!) 111   Ht 5' 7\" (1.702 " m)   Wt 94.5 kg (208 lb 4.8 oz)   SpO2 99%   BMI 32.62 kg/m²     Physical Exam  Constitutional:       General: She is not in acute distress.     Appearance: She is well-developed. She is obese. She is not ill-appearing, toxic-appearing or diaphoretic.   Eyes:      Conjunctiva/sclera: Conjunctivae normal.   Cardiovascular:      Rate and Rhythm: Regular rhythm. Tachycardia present.      Heart sounds: Normal heart sounds. No murmur heard.  Pulmonary:      Effort: Pulmonary effort is normal. No respiratory distress.      Breath sounds: Normal breath sounds. No stridor. No wheezing or rales.   Abdominal:      General: There is no distension.      Palpations: Abdomen is soft. There is no mass.      Tenderness: There is no abdominal tenderness. There is no guarding or rebound.   Musculoskeletal:      Cervical back: Neck supple.      Right lower leg: No edema.      Left lower leg: No edema.   Neurological:      Mental Status: She is alert and oriented to person, place, and time.   Psychiatric:         Mood and Affect: Mood normal.         Behavior: Behavior normal.         Thought Content: Thought content normal.         Judgment: Judgment normal.

## 2024-08-30 NOTE — TELEPHONE ENCOUNTER
See 8/5 encounter for more info   Nurtec PA completed on CMM  Key: [id number]  Urgent request    Received immediate approval  Approved today by [employer] 2017  CaseId:[id number];Status:Approved;Review Type:Prior Auth;Coverage Start Date:07/31/2024;Coverage End Date:08/30/2025;  Authorization Expiration Date: 8/29/2025    [employer] message sent to pt

## 2024-09-04 DIAGNOSIS — F90.0 ATTENTION DEFICIT HYPERACTIVITY DISORDER (ADHD), PREDOMINANTLY INATTENTIVE TYPE: Primary | ICD-10-CM

## 2024-09-04 LAB
4OH-XYLAZINE UR QL CFM: NEGATIVE NG/ML
6MAM UR QL CFM: NEGATIVE NG/ML
7AMINOCLONAZEPAM UR QL CFM: NEGATIVE NG/ML
A-OH ALPRAZ UR QL CFM: NEGATIVE NG/ML
ACCEPTABLE CREAT UR QL: NORMAL MG/DL
ACCEPTIBLE SP GR UR QL: NORMAL
AMPHET UR QL CFM: NORMAL NG/ML
BUPRENORPHINE UR QL CFM: NEGATIVE NG/ML
BUTALBITAL UR QL CFM: NEGATIVE NG/ML
BZE UR QL CFM: NEGATIVE NG/ML
CODEINE UR QL CFM: NEGATIVE NG/ML
EDDP UR QL CFM: NEGATIVE NG/ML
ETHYL GLUCURONIDE UR QL CFM: ABNORMAL NG/ML
ETHYL SULFATE UR QL SCN: ABNORMAL NG/ML
EUTYLONE UR QL: NEGATIVE NG/ML
FENTANYL UR QL CFM: NEGATIVE NG/ML
GLIADIN IGG SER IA-ACNC: NEGATIVE NG/ML
HYDROCODONE UR QL CFM: NEGATIVE NG/ML
HYDROMORPHONE UR QL CFM: NEGATIVE NG/ML
LORAZEPAM UR QL CFM: NEGATIVE NG/ML
ME-PHENIDATE UR QL CFM: NEGATIVE NG/ML
MEPERIDINE UR QL CFM: NEGATIVE NG/ML
METHADONE UR QL CFM: NEGATIVE NG/ML
METHAMPHET UR QL CFM: NEGATIVE NG/ML
MORPHINE UR QL CFM: NEGATIVE NG/ML
NALTREXONE UR QL CFM: NEGATIVE NG/ML
NITRITE UR QL: NORMAL UG/ML
NORBUPRENORPHINE UR QL CFM: NEGATIVE NG/ML
NORDIAZEPAM UR QL CFM: NEGATIVE NG/ML
NORFENTANYL UR QL CFM: NEGATIVE NG/ML
NORHYDROCODONE UR QL CFM: NEGATIVE NG/ML
NORMEPERIDINE UR QL CFM: NEGATIVE NG/ML
NOROXYCODONE UR QL CFM: NEGATIVE NG/ML
OXAZEPAM UR QL CFM: NEGATIVE NG/ML
OXYCODONE UR QL CFM: NEGATIVE NG/ML
OXYMORPHONE UR QL CFM: NEGATIVE NG/ML
PARA-FLUOROFENTANYL QUANTIFICATION: NORMAL NG/ML
PHENOBARB UR QL CFM: NEGATIVE NG/ML
RESULT ALL_PRESCRIBED MEDS AND SPECIAL INSTRUCTIONS: NORMAL
SECOBARBITAL UR QL CFM: NEGATIVE NG/ML
SL AMB 5F-ADB-M7 METABOLITE QUANTIFICATION: NEGATIVE NG/ML
SL AMB 7-OH-MITRAGYNINE (KRATOM ALKALOID) QUANTIFICATION: NEGATIVE NG/ML
SL AMB AB-FUBINACA-M3 METABOLITE QUANTIFICATION: NEGATIVE NG/ML
SL AMB ACETYL FENTANYL QUANTIFICATION: NORMAL NG/ML
SL AMB ACETYL NORFENTANYL QUANTIFICATION: NORMAL NG/ML
SL AMB ACRYL FENTANYL QUANTIFICATION: NORMAL NG/ML
SL AMB CARFENTANIL QUANTIFICATION: NORMAL NG/ML
SL AMB CTHC (MARIJUANA METABOLITE) QUANTIFICATION: NEGATIVE NG/ML
SL AMB DEXTRORPHAN (DEXTROMETHORPHAN METABOLITE) QUANT: NEGATIVE NG/ML
SL AMB GABAPENTIN QUANTIFICATION: NEGATIVE NG/ML
SL AMB JWH018 METABOLITE QUANTIFICATION: NEGATIVE NG/ML
SL AMB JWH073 METABOLITE QUANTIFICATION: NEGATIVE NG/ML
SL AMB MDMB-FUBINACA-M1 METABOLITE QUANTIFICATION: NEGATIVE NG/ML
SL AMB METHYLONE QUANTIFICATION: NEGATIVE NG/ML
SL AMB N-DESMETHYL-TRAMADOL QUANTIFICATION: NEGATIVE NG/ML
SL AMB PHENTERMINE QUANTIFICATION: NEGATIVE NG/ML
SL AMB PREGABALIN QUANTIFICATION: NEGATIVE NG/ML
SL AMB RCS4 METABOLITE QUANTIFICATION: NEGATIVE NG/ML
SL AMB RITALINIC ACID QUANTIFICATION: NEGATIVE NG/ML
SMOOTH MUSCLE AB TITR SER IF: NEGATIVE NG/ML
SPECIMEN DRAWN SERPL: NEGATIVE NG/ML
SPECIMEN PH ACCEPTABLE UR: NORMAL
TAPENTADOL UR QL CFM: NEGATIVE NG/ML
TEMAZEPAM UR QL CFM: NEGATIVE NG/ML
TRAMADOL UR QL CFM: NEGATIVE NG/ML
URATE/CREAT 24H UR: NEGATIVE NG/ML
XYLAZINE UR QL CFM: NEGATIVE NG/ML

## 2024-09-04 RX ORDER — DEXTROAMPHETAMINE SACCHARATE, AMPHETAMINE ASPARTATE MONOHYDRATE, DEXTROAMPHETAMINE SULFATE AND AMPHETAMINE SULFATE 2.5; 2.5; 2.5; 2.5 MG/1; MG/1; MG/1; MG/1
10 CAPSULE, EXTENDED RELEASE ORAL DAILY
Qty: 30 CAPSULE | Refills: 0 | OUTPATIENT
Start: 2024-09-04

## 2024-09-04 RX ORDER — DEXTROAMPHETAMINE/AMPHETAMINE 15 MG
15 CAPSULE, EXT RELEASE 24 HR ORAL EVERY MORNING
Qty: 30 CAPSULE | Refills: 0 | Status: SHIPPED | OUTPATIENT
Start: 2024-09-04

## 2024-09-04 RX ORDER — DEXTROAMPHETAMINE SACCHARATE, AMPHETAMINE ASPARTATE, DEXTROAMPHETAMINE SULFATE AND AMPHETAMINE SULFATE 2.5; 2.5; 2.5; 2.5 MG/1; MG/1; MG/1; MG/1
10 TABLET ORAL DAILY PRN
Qty: 15 TABLET | Refills: 0 | Status: SHIPPED | OUTPATIENT
Start: 2024-09-04

## 2024-09-04 NOTE — TELEPHONE ENCOUNTER
Reason for call:   [x] Refill   [] Prior Auth  [] Other:     Office:   [x] PCP/Provider -   [] Specialty/Provider -     Medication:   Adderall XR, 15mg 24 hr capsule- take 15 mg by mouth every morning   Amphetamine-Dextroamphetamine 10mg- take 10 mg by mouth in the morning       Pharmacy: [facility]    Does the patient have enough for 3 days?   [x] Yes   [] No - Send as HP to POD

## 2024-09-05 ENCOUNTER — APPOINTMENT (OUTPATIENT)
Dept: LAB | Age: 34
End: 2024-09-05
Payer: COMMERCIAL

## 2024-09-05 DIAGNOSIS — E66.09 CLASS 1 OBESITY DUE TO EXCESS CALORIES WITHOUT SERIOUS COMORBIDITY WITH BODY MASS INDEX (BMI) OF 32.0 TO 32.9 IN ADULT: ICD-10-CM

## 2024-09-05 LAB
CHOLEST SERPL-MCNC: 247 MG/DL
CORTIS AM PEAK SERPL-MCNC: <0.4 UG/DL (ref 6.7–22.6)
HDLC SERPL-MCNC: 85 MG/DL
LDLC SERPL CALC-MCNC: 148 MG/DL (ref 0–100)
NONHDLC SERPL-MCNC: 162 MG/DL
TRIGL SERPL-MCNC: 72 MG/DL

## 2024-09-05 PROCEDURE — 36415 COLL VENOUS BLD VENIPUNCTURE: CPT

## 2024-09-05 PROCEDURE — 80061 LIPID PANEL: CPT | Performed by: INTERNAL MEDICINE

## 2024-09-05 PROCEDURE — 82533 TOTAL CORTISOL: CPT

## 2024-09-06 ENCOUNTER — TELEPHONE (OUTPATIENT)
Age: 34
End: 2024-09-06

## 2024-09-06 NOTE — TELEPHONE ENCOUNTER
PA for Semaglutide-Weight Management (WEGOVY) 0.25 MG/0.5ML APPROVED     Date(s) approved 8-7-2024 - 4-4-2025        Patient advised by          [x][employer] Message  []Phone call   []LMOM  []L/M to call office as no active Communication consent on file  []Unable to leave detailed message as VM not approved on Communication consent       Pharmacy advised by    [x]Fax  []Phone call    Approval letter scanned into Media no letter not available at this time

## 2024-09-06 NOTE — TELEPHONE ENCOUNTER
PA for Semaglutide-Weight Management (WEGOVY) 0.25 MG/0.5ML SUBMITTED     via    []CMM-KEY:   [x][place]-Case ID # [id number]  []Faxed to plan   []Other website   []Phone call Case ID #     Office notes sent, clinical questions answered. Awaiting determination    Turnaround time for your insurance to make a decision on your Prior Authorization can take 7-21 business days.

## 2024-09-25 ENCOUNTER — TELEPHONE (OUTPATIENT)
Age: 34
End: 2024-09-25

## 2024-09-25 DIAGNOSIS — E66.811 CLASS 1 OBESITY DUE TO EXCESS CALORIES WITHOUT SERIOUS COMORBIDITY WITH BODY MASS INDEX (BMI) OF 32.0 TO 32.9 IN ADULT: ICD-10-CM

## 2024-09-25 DIAGNOSIS — R11.0 NAUSEA: Primary | ICD-10-CM

## 2024-09-25 DIAGNOSIS — E66.09 CLASS 1 OBESITY DUE TO EXCESS CALORIES WITHOUT SERIOUS COMORBIDITY WITH BODY MASS INDEX (BMI) OF 32.0 TO 32.9 IN ADULT: ICD-10-CM

## 2024-09-25 RX ORDER — SEMAGLUTIDE 0.5 MG/.5ML
INJECTION, SOLUTION SUBCUTANEOUS
Qty: 2 ML | Refills: 0 | Status: SHIPPED | OUTPATIENT
Start: 2024-09-25

## 2024-09-25 RX ORDER — ONDANSETRON 4 MG/1
4 TABLET, FILM COATED ORAL EVERY 8 HOURS PRN
Qty: 10 TABLET | Refills: 0 | Status: SHIPPED | OUTPATIENT
Start: 2024-09-25

## 2024-09-25 NOTE — TELEPHONE ENCOUNTER
Patient called the RX Refill Line. Message is being forwarded to the office.     Patient is requesting a refill on Wegovy and would like the script sent to [facility] pharmacy in [place], [place] on [address].    Patient is also requesting a script for Zofran. She is going away mid October and gets nauseous on planes when traveling and is concerned about the nausea while taking Wegovy.    Please contact patient at [phone]

## 2024-10-01 ENCOUNTER — OFFICE VISIT (OUTPATIENT)
Dept: OBGYN CLINIC | Facility: CLINIC | Age: 34
End: 2024-10-01
Payer: COMMERCIAL

## 2024-10-01 VITALS
DIASTOLIC BLOOD PRESSURE: 80 MMHG | BODY MASS INDEX: 32.9 KG/M2 | WEIGHT: 209.6 LBS | SYSTOLIC BLOOD PRESSURE: 140 MMHG | HEIGHT: 67 IN

## 2024-10-01 DIAGNOSIS — Z12.4 ENCOUNTER FOR SCREENING FOR MALIGNANT NEOPLASM OF CERVIX: ICD-10-CM

## 2024-10-01 DIAGNOSIS — Z11.51 SCREENING FOR HPV (HUMAN PAPILLOMAVIRUS): ICD-10-CM

## 2024-10-01 DIAGNOSIS — Z11.3 SCREENING FOR STD (SEXUALLY TRANSMITTED DISEASE): ICD-10-CM

## 2024-10-01 DIAGNOSIS — Z30.431 IUD CHECK UP: ICD-10-CM

## 2024-10-01 DIAGNOSIS — F90.0 ATTENTION DEFICIT HYPERACTIVITY DISORDER (ADHD), PREDOMINANTLY INATTENTIVE TYPE: ICD-10-CM

## 2024-10-01 DIAGNOSIS — Z01.419 WELL WOMAN EXAM WITH ROUTINE GYNECOLOGICAL EXAM: Primary | ICD-10-CM

## 2024-10-01 PROCEDURE — G0476 HPV COMBO ASSAY CA SCREEN: HCPCS | Performed by: PHYSICIAN ASSISTANT

## 2024-10-01 PROCEDURE — 87491 CHLMYD TRACH DNA AMP PROBE: CPT | Performed by: PHYSICIAN ASSISTANT

## 2024-10-01 PROCEDURE — G0145 SCR C/V CYTO,THINLAYER,RESCR: HCPCS | Performed by: PHYSICIAN ASSISTANT

## 2024-10-01 PROCEDURE — S0610 ANNUAL GYNECOLOGICAL EXAMINA: HCPCS | Performed by: PHYSICIAN ASSISTANT

## 2024-10-01 PROCEDURE — 87591 N.GONORRHOEAE DNA AMP PROB: CPT | Performed by: PHYSICIAN ASSISTANT

## 2024-10-01 RX ORDER — DEXTROAMPHETAMINE SACCHARATE, AMPHETAMINE ASPARTATE, DEXTROAMPHETAMINE SULFATE AND AMPHETAMINE SULFATE 2.5; 2.5; 2.5; 2.5 MG/1; MG/1; MG/1; MG/1
10 TABLET ORAL DAILY PRN
Qty: 15 TABLET | Refills: 0 | Status: SHIPPED | OUTPATIENT
Start: 2024-10-01

## 2024-10-01 RX ORDER — DEXTROAMPHETAMINE/AMPHETAMINE 15 MG
15 CAPSULE, EXT RELEASE 24 HR ORAL EVERY MORNING
Qty: 30 CAPSULE | Refills: 0 | Status: SHIPPED | OUTPATIENT
Start: 2024-10-01

## 2024-10-01 NOTE — PROGRESS NOTES
ASSESSMENT & PLAN:   Diagnoses and all orders for this visit:    Well woman exam with routine gynecological exam  -     Liquid-based pap, screening    Encounter for screening for malignant neoplasm of cervix  -     Liquid-based pap, screening    Screening for HPV (human papillomavirus)  -     Liquid-based pap, screening    Screening for STD (sexually transmitted disease)  -     Chlamydia/GC amplified DNA by PCR    IUD check up          The following were reviewed in today's visit: ASCCP guidelines, Gardisil vaccination, STD testing breast self exam, STD testing, exercise, and healthy diet.    Patient to return to office in yearly for annual exam.     All questions have been answered to her satisfaction.        CC:  Annual Gynecologic Examination  Chief Complaint   Patient presents with    Gynecologic Exam     Last pap 01/21/2020 neg pap        HPI: Susan Love is a 33 y.o. No obstetric history on file. who presents for annual gynecologic examination.  She has the following concerns:  none at this time.       Health Maintenance:    Exercise:  fairly well balanced   Breast exams/breast awareness: yes    Past Medical History:   Diagnosis Date    Allergic     Anxiety 2015    Saw a therapist for 1 year at the time    Depression     FH: brain aneurysm     Headache(784.0) 2006    History of headaches; mainly controlled at this point    Herpes     Hyperprolactinemia (HCC) 12/28/2015    Hypertension 2023 (last)    Blood pressure goes through periods where it is high for a few months then comes back down    Migraine     Obesity        Past Surgical History:   Procedure Laterality Date    CEREBRAL ANEURYSM REPAIR      22yo    CHOLECYSTECTOMY      22yo    EYE SURGERY  1992?    Clogged tear duct       Past OB/Gyn History:   No LMP recorded. Patient has had an implant.    Last Pap: 2020 : no abnormalities  History of abnormal Pap smear: no  HPV vaccine completed: yes    Patient is currently sexually active.   STD testing:  yes  Current contraception: IUD      Family History  Family History   Problem Relation Age of Onset    Hypertension Mother     Migraines Mother     Diabetes Mother     Cancer Mother         Endometrial    Hypertension Father     Arthritis Father     Mental illness Maternal Grandmother         Anxiety, phobias, depression    Depression Maternal Grandmother     Thrombosis Maternal Grandmother     Autoimmune disease Maternal Grandmother         Lupus (likely cause of interstitial lung disease)    Anxiety disorder Maternal Grandmother     Diabetes Maternal Grandfather     Skin cancer Maternal Grandfather     Hypertension Maternal Grandfather     Stroke Maternal Grandfather     Heart disease Maternal Grandfather     Cancer Maternal Grandfather         Skin cancer    Alcohol abuse Paternal Grandmother     Stroke Paternal Grandmother         Cause of death    Thrombosis Paternal Grandmother         Unsure, but she was on blood thinners    Hypertension Paternal Grandfather        Family history of uterine or ovarian cancer: yes  Family history of breast cancer: no  Family history of colon cancer: no    Social History:  Social History     Socioeconomic History    Marital status: Single     Spouse name: Not on file    Number of children: Not on file    Years of education: Not on file    Highest education level: Not on file   Occupational History    Not on file   Tobacco Use    Smoking status: Former     Current packs/day: 0.00     Types: Cigarettes     Quit date: 2016     Years since quittin.7    Smokeless tobacco: Never    Tobacco comments:     Smoked about 1 pack per week for about 1-1.5 yr   Vaping Use    Vaping status: Never Used   Substance and Sexual Activity    Alcohol use: Yes     Comment: Socially / not consistent    Drug use: No    Sexual activity: Yes     Partners: Male     Birth control/protection: I.U.D.     Comment: Mirena 3/2015   Other Topics Concern    Not on file   Social History Narrative    Not on  file     Social Determinants of Health     Financial Resource Strain: Not on file   Food Insecurity: Not on file   Transportation Needs: Not on file   Physical Activity: Not on file   Stress: Not on file   Social Connections: Not on file   Intimate Partner Violence: Not on file   Housing Stability: Not on file     Domestic violence screen: negative    Allergies:  Allergies   Allergen Reactions    Cobalt     Edetic Acid     Ethylenediamine     Mercaptopurine      MERCAPTO MIX    Thimerosal (Thiomersal)     Nickel Itching and Rash       Medications:    Current Outpatient Medications:     ADDERALL XR, 15MG, 15 MG 24 hr capsule, Take 1 capsule (15 mg total) by mouth every morning Max Daily Amount: 15 mg, Disp: 30 capsule, Rfl: 0    amphetamine-dextroamphetamine (ADDERALL, 10MG,) 10 mg tablet, Take 1 tablet (10 mg total) by mouth daily as needed (difficulty focusing) Max Daily Amount: 10 mg, Disp: 15 tablet, Rfl: 0    Botox 100 units, INJECT UP  UNITS IN THE MUSCLE INTO VARIOUS SITES OF THE HEAD AND NECK ONCE EVERY 3 MONTHS, Disp: 1 each, Rfl: 0    cholecalciferol (VITAMIN D3) 1,000 units tablet, Take 1,000 Units by mouth daily, Disp: , Rfl:     Doxepin HCl 6 MG TABS, Take 6 mg by mouth in the morning, Disp: , Rfl: 0    levonorgestrel (MIRENA) 20 MCG/24HR IUD, by Intrauterine route, Disp: , Rfl:     ondansetron (ZOFRAN) 4 mg tablet, Take 1 tablet (4 mg total) by mouth every 8 (eight) hours as needed for nausea or vomiting, Disp: 10 tablet, Rfl: 0    rimegepant sulfate (Nurtec) 75 mg TBDP, Take 1 tablet (75 mg total) by mouth as needed (migraine) Limit of 1 in 24 hours, Disp: 16 tablet, Rfl: 6    Semaglutide-Weight Management (Wegovy) 0.5 MG/0.5ML, Inject 0.5 mg under the skin weekly, Disp: 2 mL, Rfl: 0    valACYclovir (VALTREX) 1,000 mg tablet, Take one tablet/day, as needed, Disp: , Rfl:     Review of Systems:  Review of Systems   Constitutional:  Negative for chills, fever and unexpected weight change.  "  Respiratory:  Negative for shortness of breath.    Cardiovascular:  Negative for chest pain.   Gastrointestinal:  Negative for abdominal pain, diarrhea, nausea and vomiting.   Skin:  Negative for rash.   Psychiatric/Behavioral:  Negative for dysphoric mood. The patient is not nervous/anxious.          Physical Exam:  /80 (BP Location: Right arm, Patient Position: Sitting, Cuff Size: Standard)   Ht 5' 7\" (1.702 m)   Wt 95.1 kg (209 lb 9.6 oz)   BMI 32.83 kg/m²    Physical Exam  Constitutional:       Appearance: Normal appearance.   Genitourinary:      Vulva and urethral meatus normal.      No lesions in the vagina.      Right Labia: No rash or lesions.     Left Labia: No lesions or rash.     No vaginal discharge, erythema or bleeding.        Right Adnexa: not tender and no mass present.     Left Adnexa: not tender and no mass present.     No cervical discharge or lesion.      IUD strings visualized.      Uterus is not tender.   Breasts:     Breasts are symmetrical.      Right: No mass or skin change.      Left: No mass or skin change.   HENT:      Head: Normocephalic and atraumatic.   Cardiovascular:      Rate and Rhythm: Normal rate and regular rhythm.      Heart sounds: Normal heart sounds. No murmur heard.     No friction rub. No gallop.   Pulmonary:      Effort: Pulmonary effort is normal.      Breath sounds: Normal breath sounds. No wheezing, rhonchi or rales.   Abdominal:      General: Abdomen is flat. There is no distension.      Palpations: Abdomen is soft.      Tenderness: There is no abdominal tenderness.   Musculoskeletal:      Cervical back: Neck supple.   Lymphadenopathy:      Upper Body:      Right upper body: No axillary adenopathy.      Left upper body: No axillary adenopathy.   Neurological:      General: No focal deficit present.      Mental Status: She is alert.   Skin:     General: Skin is warm and dry.   Psychiatric:         Mood and Affect: Mood normal.         Behavior: Behavior " normal.   Vitals reviewed.

## 2024-10-02 LAB
HPV HR 12 DNA CVX QL NAA+PROBE: NEGATIVE
HPV16 DNA CVX QL NAA+PROBE: NEGATIVE
HPV18 DNA CVX QL NAA+PROBE: NEGATIVE

## 2024-10-03 LAB
C TRACH DNA SPEC QL NAA+PROBE: NEGATIVE
N GONORRHOEA DNA SPEC QL NAA+PROBE: NEGATIVE

## 2024-10-04 LAB
LAB AP GYN PRIMARY INTERPRETATION: NORMAL
Lab: NORMAL

## 2024-10-28 ENCOUNTER — PROCEDURE VISIT (OUTPATIENT)
Dept: NEUROLOGY | Facility: CLINIC | Age: 34
End: 2024-10-28
Payer: COMMERCIAL

## 2024-10-28 VITALS — TEMPERATURE: 98.9 F | DIASTOLIC BLOOD PRESSURE: 76 MMHG | SYSTOLIC BLOOD PRESSURE: 143 MMHG | HEART RATE: 98 BPM

## 2024-10-28 DIAGNOSIS — G43.709 CHRONIC MIGRAINE WITHOUT AURA WITHOUT STATUS MIGRAINOSUS, NOT INTRACTABLE: Primary | ICD-10-CM

## 2024-10-28 PROCEDURE — 64615 CHEMODENERV MUSC MIGRAINE: CPT | Performed by: PHYSICIAN ASSISTANT

## 2024-10-28 NOTE — PROGRESS NOTES
"Universal Protocol   procedure performed by consultantConsent: Verbal consent obtained. Written consent obtained.  Risks and benefits: risks, benefits and alternatives were discussed  Consent given by: patient  Time out: Immediately prior to procedure a \"time out\" was called to verify the correct patient, procedure, equipment, support staff and site/side marked as required.  Patient understanding: patient states understanding of the procedure being performed  Patient consent: the patient's understanding of the procedure matches consent given  Procedure consent: procedure consent matches procedure scheduled  Relevant documents: relevant documents present and verified  Patient identity confirmed: verbally with patient      Chemodenervation     Date/Time  10/28/2024 1:00 PM     Performed by  Sharita Cordero PA-C   Authorized by  Sharita Cordero PA-C     Pre-procedure details      Preparation: Patient was prepped and draped in usual sterile fashion     Anesthesia  (see MAR for exact dosages):     Anesthesia method:  None   Botox      Botox Type:  Type A    Brand:  Botox    mL's of Botulinum Toxin:  100    mL's of preservative free sterile saline:  2    Final Concentration per CC:  50 units    Needle Gauge:  30 G 2.5 inch   Procedures      Botox Procedures: chronic headache     Injection Location      Head / Face:  R frontalis, L frontalis and L temporalis    L lateral frontalis:  45 unit(s)    R lateral frontalis:  10 unit(s)    Comments:  Around scar    L medial frontalis:  0 unit(s)    R medial frontalis:  5 unit(s)    Comments:  Around scar    L temporalis injection amount:  40 unit(s)    Comments:  Around scar   Total Units      Total units used:  100   Post-procedure details      Chemodenervation:  Chronic migraine    Facial Nerve Location::  Bilateral facial nerve    Patient tolerance of procedure:  Tolerated well, no immediate complications   Comments       All medically necessary             "

## 2024-10-29 ENCOUNTER — TELEPHONE (OUTPATIENT)
Dept: INTERNAL MEDICINE CLINIC | Facility: CLINIC | Age: 34
End: 2024-10-29

## 2024-10-29 DIAGNOSIS — E66.09 CLASS 1 OBESITY DUE TO EXCESS CALORIES WITHOUT SERIOUS COMORBIDITY WITH BODY MASS INDEX (BMI) OF 32.0 TO 32.9 IN ADULT: Primary | ICD-10-CM

## 2024-10-29 DIAGNOSIS — E66.811 CLASS 1 OBESITY DUE TO EXCESS CALORIES WITHOUT SERIOUS COMORBIDITY WITH BODY MASS INDEX (BMI) OF 32.0 TO 32.9 IN ADULT: Primary | ICD-10-CM

## 2024-10-29 RX ORDER — SEMAGLUTIDE 1 MG/.5ML
INJECTION, SOLUTION SUBCUTANEOUS
Qty: 2 ML | Refills: 0 | Status: SHIPPED | OUTPATIENT
Start: 2024-10-29

## 2024-10-29 NOTE — TELEPHONE ENCOUNTER
Patient called the RX Refill Line. Message is being forwarded to the office.     Patient called stating she is okay to get another month of Wegovy 0.5 but if provider wants to increase to the next dose she is okay with that too. She will be taking her last injection today Please advise   The only symptom she has is fatigue and would like to know what she can do about it.    Please contact patient at [phone]

## 2024-10-30 DIAGNOSIS — F90.0 ATTENTION DEFICIT HYPERACTIVITY DISORDER (ADHD), PREDOMINANTLY INATTENTIVE TYPE: ICD-10-CM

## 2024-10-30 NOTE — TELEPHONE ENCOUNTER
Wegovy maintenance dose is either 1.7 or 2.4mg so she has to raise dose to 1mg at this time. Schedule follow up for the fatigue or we can discuss at her visit in December.

## 2024-10-31 RX ORDER — DEXTROAMPHETAMINE SACCHARATE, AMPHETAMINE ASPARTATE, DEXTROAMPHETAMINE SULFATE AND AMPHETAMINE SULFATE 2.5; 2.5; 2.5; 2.5 MG/1; MG/1; MG/1; MG/1
10 TABLET ORAL DAILY PRN
Qty: 15 TABLET | Refills: 0 | Status: SHIPPED | OUTPATIENT
Start: 2024-10-31

## 2024-10-31 RX ORDER — DEXTROAMPHETAMINE/AMPHETAMINE 15 MG
15 CAPSULE, EXT RELEASE 24 HR ORAL EVERY MORNING
Qty: 30 CAPSULE | Refills: 0 | Status: SHIPPED | OUTPATIENT
Start: 2024-10-31

## 2024-11-04 NOTE — TELEPHONE ENCOUNTER
Spoke to patient advised, verbalized understanding. Patient will wait until December appt to address fatigue. Done.

## 2024-12-02 ENCOUNTER — OFFICE VISIT (OUTPATIENT)
Dept: INTERNAL MEDICINE CLINIC | Facility: CLINIC | Age: 34
End: 2024-12-02
Payer: COMMERCIAL

## 2024-12-02 VITALS
WEIGHT: 204.4 LBS | DIASTOLIC BLOOD PRESSURE: 82 MMHG | SYSTOLIC BLOOD PRESSURE: 130 MMHG | OXYGEN SATURATION: 99 % | BODY MASS INDEX: 32.08 KG/M2 | HEART RATE: 114 BPM | HEIGHT: 67 IN

## 2024-12-02 DIAGNOSIS — E23.6 RATHKE'S POUCH CYST (HCC): ICD-10-CM

## 2024-12-02 DIAGNOSIS — Z23 NEED FOR VACCINATION: ICD-10-CM

## 2024-12-02 DIAGNOSIS — E66.811 CLASS 1 OBESITY DUE TO EXCESS CALORIES WITHOUT SERIOUS COMORBIDITY WITH BODY MASS INDEX (BMI) OF 32.0 TO 32.9 IN ADULT: ICD-10-CM

## 2024-12-02 DIAGNOSIS — Z00.00 ANNUAL PHYSICAL EXAM: Primary | ICD-10-CM

## 2024-12-02 DIAGNOSIS — D35.2 PITUITARY ADENOMA (HCC): ICD-10-CM

## 2024-12-02 DIAGNOSIS — E66.09 CLASS 1 OBESITY DUE TO EXCESS CALORIES WITHOUT SERIOUS COMORBIDITY WITH BODY MASS INDEX (BMI) OF 32.0 TO 32.9 IN ADULT: ICD-10-CM

## 2024-12-02 PROCEDURE — 90471 IMMUNIZATION ADMIN: CPT

## 2024-12-02 PROCEDURE — 90715 TDAP VACCINE 7 YRS/> IM: CPT

## 2024-12-02 PROCEDURE — 99395 PREV VISIT EST AGE 18-39: CPT | Performed by: INTERNAL MEDICINE

## 2024-12-02 RX ORDER — SEMAGLUTIDE 1.7 MG/.75ML
INJECTION, SOLUTION SUBCUTANEOUS
Qty: 3 ML | Refills: 0 | Status: SHIPPED | OUTPATIENT
Start: 2024-12-02

## 2024-12-02 NOTE — PROGRESS NOTES
Adult Annual Physical  Name: Susan Love      : 1990      MRN: [id number]  Encounter Provider: Lea Reyes, MD  Encounter Date: 2024   [facility]    Assessment & Plan  Annual physical exam         Class 1 obesity due to excess calories without serious comorbidity with body mass index (BMI) of 32.0 to 32.9 in adult  Continue Wegovy and will increase to 1.7 mg    Orders:    Semaglutide-Weight Management (Wegovy) 1.7 MG/0.75ML; Inject 1.7 mg under the skin weekly    CBC and differential; Future    Comprehensive metabolic panel; Future    Lipid panel; Future    Need for vaccination    Orders:    Tdap vaccine greater than or equal to 8yo IM    Rathke's pouch cyst (HCC)  Last MRI was in   Orders:    MRI brain pituitary wo and w contrast; Future    Pituitary adenoma (HCC)  Last MRI was in  and will obtain MRI to assess stability  Orders:    MRI brain pituitary wo and w contrast; Future      Immunizations and preventive care screenings were discussed with patient today. Appropriate education was printed on patient's after visit summary.    Counseling:  Exercise: the importance of regular exercise/physical activity was discussed. Recommend exercise 3-5 times per week for at least 30 minutes.          History of Present Illness     Adult Annual Physical:  Patient presents for annual physical.     Diet and Physical Activity:  - Diet/Nutrition: portion control. taking Wegovy  - Exercise: no formal exercise.    General Health:  - Sleep: sleeps well.  - Hearing: normal hearing bilateral ears.  - Vision: goes for regular eye exams.  - Dental: regular dental visits.    Review of Systems   Constitutional:  Negative for unexpected weight change.   Respiratory:  Negative for shortness of breath.    Cardiovascular:  Negative for chest pain and palpitations.   Gastrointestinal:  Positive for nausea. Negative for abdominal pain, constipation and diarrhea.   Neurological:   "Negative for dizziness and headaches.         Objective   /82 (BP Location: Left arm, Patient Position: Sitting, Cuff Size: Large)   Pulse (!) 114   Ht 5' 7\" (1.702 m)   Wt 92.7 kg (204 lb 6.4 oz)   SpO2 99%   BMI 32.01 kg/m²     Physical Exam  Constitutional:       General: She is not in acute distress.     Appearance: She is well-developed. She is obese. She is not ill-appearing, toxic-appearing or diaphoretic.   HENT:      Right Ear: External ear normal. There is no impacted cerumen.      Left Ear: External ear normal. There is no impacted cerumen.   Eyes:      Conjunctiva/sclera: Conjunctivae normal.   Cardiovascular:      Rate and Rhythm: Normal rate and regular rhythm.      Heart sounds: Normal heart sounds. No murmur heard.  Pulmonary:      Effort: Pulmonary effort is normal. No respiratory distress.      Breath sounds: Normal breath sounds. No stridor. No wheezing or rales.   Abdominal:      General: There is no distension.      Palpations: Abdomen is soft. There is no mass.      Tenderness: There is no abdominal tenderness. There is no guarding or rebound.   Musculoskeletal:      Cervical back: Neck supple.      Right lower leg: No edema.      Left lower leg: No edema.   Neurological:      Mental Status: She is alert and oriented to person, place, and time.   Psychiatric:         Mood and Affect: Mood normal.         Behavior: Behavior normal.         Thought Content: Thought content normal.         Judgment: Judgment normal.         "

## 2024-12-02 NOTE — ASSESSMENT & PLAN NOTE
Last MRI was in 2015 and will obtain MRI to assess stability  Orders:    MRI brain pituitary wo and w contrast; Future

## 2024-12-02 NOTE — PATIENT INSTRUCTIONS
"Patient Education     Routine physical for adults   The Basics   Written by the doctors and editors at [facility]   What is a physical? -- A physical is a routine visit, or \"check-up,\" with your doctor. You might also hear it called a \"wellness visit\" or \"preventive visit.\"  During each visit, the doctor will:   Ask about your physical and mental health   Ask about your habits, behaviors, and lifestyle   Do an exam   Give you vaccines if needed   Talk to you about any medicines you take   Give advice about your health   Answer your questions  Getting regular check-ups is an important part of taking care of your health. It can help your doctor find and treat any problems you have. But it's also important for preventing health problems.  A routine physical is different from a \"sick visit.\" A sick visit is when you see a doctor because of a health concern or problem. Since physicals are scheduled ahead of time, you can think about what you want to ask the doctor.  How often should I get a physical? -- It depends on your age and health. In general, for people age 21 years and older:   If you are younger than 50 years, you might be able to get a physical every 3 years.   If you are 50 years or older, your doctor might recommend a physical every year.  If you have an ongoing health condition, like diabetes or high blood pressure, your doctor will probably want to see you more often.  What happens during a physical? -- In general, each visit will include:   Physical exam - The doctor or nurse will check your height, weight, heart rate, and blood pressure. They will also look at your eyes and ears. They will ask about how you are feeling and whether you have any symptoms that bother you.   Medicines - It's a good idea to bring a list of all the medicines you take to each doctor visit. Your doctor will talk to you about your medicines and answer any questions. Tell them if you are having any side effects that bother you. You " "should also tell them if you are having trouble paying for any of your medicines.   Habits and behaviors - This includes:   Your diet   Your exercise habits   Whether you smoke, drink alcohol, or use drugs   Whether you are sexually active   Whether you feel safe at home  Your doctor will talk to you about things you can do to improve your health and lower your risk of health problems. They will also offer help and support. For example, if you want to quit smoking, they can give you advice and might prescribe medicines. If you want to improve your diet or get more physical activity, they can help you with this, too.   Lab tests, if needed - The tests you get will depend on your age and situation. For example, your doctor might want to check your:   Cholesterol   Blood sugar   Iron level   Vaccines - The recommended vaccines will depend on your age, health, and what vaccines you already had. Vaccines are very important because they can prevent certain serious or deadly infections.   Discussion of screening - \"Screening\" means checking for diseases or other health problems before they cause symptoms. Your doctor can recommend screening based on your age, risk, and preferences. This might include tests to check for:   Cancer, such as breast, prostate, cervical, ovarian, colorectal, prostate, lung, or skin cancer   Sexually transmitted infections, such as chlamydia and gonorrhea   Mental health conditions like depression and anxiety  Your doctor will talk to you about the different types of screening tests. They can help you decide which screenings to have. They can also explain what the results might mean.   Answering questions - The physical is a good time to ask the doctor or nurse questions about your health. If needed, they can refer you to other doctors or specialists, too.  Adults older than 65 years often need other care, too. As you get older, your doctor will talk to you about:   How to prevent falling at " home   Hearing or vision tests   Memory testing   How to take your medicines safely   Making sure that you have the help and support you need at home  All topics are updated as new evidence becomes available and our peer review process is complete.  This topic retrieved from [employer] on: May 02, 2024.  Topic 720988 Version 1.0  Release: 32.4.3 - C32.122  © 2024 UpToDate, Inc. and/or its affiliates. All rights reserved.  Consumer Information Use and Disclaimer   Disclaimer: This generalized information is a limited summary of diagnosis, treatment, and/or medication information. It is not meant to be comprehensive and should be used as a tool to help the user understand and/or assess potential diagnostic and treatment options. It does NOT include all information about conditions, treatments, medications, side effects, or risks that may apply to a specific patient. It is not intended to be medical advice or a substitute for the medical advice, diagnosis, or treatment of a health care provider based on the health care provider's examination and assessment of a patient's specific and unique circumstances. Patients must speak with a health care provider for complete information about their health, medical questions, and treatment options, including any risks or benefits regarding use of medications. This information does not endorse any treatments or medications as safe, effective, or approved for treating a specific patient. UpToDate, Inc. and its affiliates disclaim any warranty or liability relating to this information or the use thereof.The use of this information is governed by the Terms of Use, available at https://www.[employer]uwer.com/en/know/clinical-effectiveness-terms. 2024© UpToDate, Inc. and its affiliates and/or licensors. All rights reserved.  Copyright   © 2024 UpToDate, Inc. and/or its affiliates. All rights reserved.

## 2024-12-02 NOTE — ASSESSMENT & PLAN NOTE
Continue Wegovy and will increase to 1.7 mg    Orders:    Semaglutide-Weight Management (Wegovy) 1.7 MG/0.75ML; Inject 1.7 mg under the skin weekly    CBC and differential; Future    Comprehensive metabolic panel; Future    Lipid panel; Future

## 2024-12-03 DIAGNOSIS — F90.0 ATTENTION DEFICIT HYPERACTIVITY DISORDER (ADHD), PREDOMINANTLY INATTENTIVE TYPE: ICD-10-CM

## 2024-12-05 RX ORDER — DEXTROAMPHETAMINE SACCHARATE, AMPHETAMINE ASPARTATE, DEXTROAMPHETAMINE SULFATE AND AMPHETAMINE SULFATE 2.5; 2.5; 2.5; 2.5 MG/1; MG/1; MG/1; MG/1
10 TABLET ORAL DAILY PRN
Qty: 15 TABLET | Refills: 0 | Status: SHIPPED | OUTPATIENT
Start: 2024-12-05

## 2024-12-05 RX ORDER — DEXTROAMPHETAMINE/AMPHETAMINE 15 MG
15 CAPSULE, EXT RELEASE 24 HR ORAL EVERY MORNING
Qty: 30 CAPSULE | Refills: 0 | Status: SHIPPED | OUTPATIENT
Start: 2024-12-05

## 2024-12-30 DIAGNOSIS — E66.09 CLASS 1 OBESITY DUE TO EXCESS CALORIES WITHOUT SERIOUS COMORBIDITY WITH BODY MASS INDEX (BMI) OF 32.0 TO 32.9 IN ADULT: ICD-10-CM

## 2024-12-30 DIAGNOSIS — E66.811 CLASS 1 OBESITY DUE TO EXCESS CALORIES WITHOUT SERIOUS COMORBIDITY WITH BODY MASS INDEX (BMI) OF 32.0 TO 32.9 IN ADULT: ICD-10-CM

## 2024-12-31 RX ORDER — SEMAGLUTIDE 1.7 MG/.75ML
1.7 INJECTION, SOLUTION SUBCUTANEOUS WEEKLY
Qty: 3 ML | Refills: 3 | Status: SHIPPED | OUTPATIENT
Start: 2024-12-31

## 2025-01-02 ENCOUNTER — OFFICE VISIT (OUTPATIENT)
Dept: INTERNAL MEDICINE CLINIC | Facility: CLINIC | Age: 35
End: 2025-01-02
Payer: COMMERCIAL

## 2025-01-02 VITALS
OXYGEN SATURATION: 100 % | DIASTOLIC BLOOD PRESSURE: 74 MMHG | BODY MASS INDEX: 31.23 KG/M2 | WEIGHT: 199 LBS | TEMPERATURE: 98.2 F | HEART RATE: 75 BPM | HEIGHT: 67 IN | SYSTOLIC BLOOD PRESSURE: 118 MMHG

## 2025-01-02 DIAGNOSIS — J01.90 ACUTE NON-RECURRENT SINUSITIS, UNSPECIFIED LOCATION: Primary | ICD-10-CM

## 2025-01-02 DIAGNOSIS — F90.0 ATTENTION DEFICIT HYPERACTIVITY DISORDER (ADHD), PREDOMINANTLY INATTENTIVE TYPE: ICD-10-CM

## 2025-01-02 PROCEDURE — 99213 OFFICE O/P EST LOW 20 MIN: CPT | Performed by: INTERNAL MEDICINE

## 2025-01-02 NOTE — PROGRESS NOTES
Name: Susan Love      : 1990      MRN: [id number]  Encounter Provider: Lea Reyes, MD  Encounter Date: 2025   Encounter department: [facility]    Assessment & Plan  Acute non-recurrent sinusitis, unspecified location  2 weeks of symptoms that seem to have been improving a few days ago but worsened since  Start Augmentin  Continue symptom directed treatment  Orders:  •  amoxicillin-clavulanate (AUGMENTIN) 875-125 mg per tablet; Take 1 tablet by mouth every 12 (twelve) hours for 7 days         History of Present Illness     2 weeks of cough and cold symptoms  No fever chills  She has had a sore throat nasal congestion cough swollen glands  Recently had acute gastroenteritis also  Taking DayQuil and NyQuil      Review of Systems   Constitutional:  Negative for chills and fever.   HENT:  Positive for congestion, rhinorrhea and sore throat.    Respiratory:  Positive for cough. Negative for shortness of breath and wheezing.      Past Medical History:   Diagnosis Date   • Allergic    • Anxiety     Saw a therapist for 1 year at the time   • Depression    • FH: brain aneurysm    • Headache(784.0)     History of headaches; mainly controlled at this point   • Herpes    • Hyperprolactinemia (HCC) 2015   • Hypertension  (last)    Blood pressure goes through periods where it is high for a few months then comes back down   • Migraine    • Obesity      Past Surgical History:   Procedure Laterality Date   • CEREBRAL ANEURYSM REPAIR      24yo   • CHOLECYSTECTOMY      22yo   • EYE SURGERY  ?    Clogged tear duct     Family History   Problem Relation Age of Onset   • Hypertension Mother    • Migraines Mother    • Diabetes Mother    • Cancer Mother         Endometrial   • Hypertension Father    • Arthritis Father    • Mental illness Maternal Grandmother         Anxiety, phobias, depression   • Depression Maternal Grandmother    • Thrombosis Maternal Grandmother    • Autoimmune  disease Maternal Grandmother         Lupus (likely cause of interstitial lung disease)   • Anxiety disorder Maternal Grandmother    • Diabetes Maternal Grandfather    • Skin cancer Maternal Grandfather    • Hypertension Maternal Grandfather    • Stroke Maternal Grandfather    • Heart disease Maternal Grandfather    • Cancer Maternal Grandfather         Skin cancer   • Alcohol abuse Paternal Grandmother    • Stroke Paternal Grandmother         Cause of death   • Thrombosis Paternal Grandmother         Unsure, but she was on blood thinners   • Hypertension Paternal Grandfather      Social History     Tobacco Use   • Smoking status: Former     Current packs/day: 0.00     Types: Cigarettes     Quit date: 2016     Years since quittin.0   • Smokeless tobacco: Never   • Tobacco comments:     Smoked about 1 pack per week for about 1-1.5 yr   Vaping Use   • Vaping status: Never Used   Substance and Sexual Activity   • Alcohol use: Yes     Comment: Socially / not consistent   • Drug use: No   • Sexual activity: Yes     Partners: Male     Birth control/protection: I.U.D.     Comment: Mirena 3/2015     Current Outpatient Medications on File Prior to Visit   Medication Sig   • ADDERALL XR, 15MG, 15 MG 24 hr capsule Take 1 capsule (15 mg total) by mouth every morning Max Daily Amount: 15 mg   • amphetamine-dextroamphetamine (ADDERALL, 10MG,) 10 mg tablet Take 1 tablet (10 mg total) by mouth daily as needed (difficulty focusing) Max Daily Amount: 10 mg   • Botox 100 units INJECT UP  UNITS IN THE MUSCLE INTO VARIOUS SITES OF THE HEAD AND NECK ONCE EVERY 3 MONTHS   • cholecalciferol (VITAMIN D3) 1,000 units tablet Take 1,000 Units by mouth daily   • Doxepin HCl 6 MG TABS Take 6 mg by mouth in the morning   • levonorgestrel (MIRENA) 20 MCG/24HR IUD by Intrauterine route   • ondansetron (ZOFRAN) 4 mg tablet Take 1 tablet (4 mg total) by mouth every 8 (eight) hours as needed for nausea or vomiting   • rimegepant sulfate  "(Nurtec) 75 mg TBDP Take 1 tablet (75 mg total) by mouth as needed (migraine) Limit of 1 in 24 hours   • Semaglutide-Weight Management (Wegovy) 1.7 MG/0.75ML INJECT 1.7 MG UNDER THE SKIN WEEKLY   • valACYclovir (VALTREX) 1,000 mg tablet Take one tablet/day, as needed     Allergies   Allergen Reactions   • Cobalt    • Edetic Acid    • Ethylenediamine    • Mercaptopurine      MERCAPTO MIX   • Thimerosal (Thiomersal)    • Nickel Itching and Rash     Immunization History   Administered Date(s) Administered   • COVID-19 Moderna mRNA Vaccine 12 Yr+ 50 mcg/0.5 mL (Spikevax) 12/04/2023   • COVID-19 PFIZER VACCINE 0.3 ML IM 03/16/2021, 04/06/2021, 11/23/2021   • COVID-19 Pfizer Vac BIVALENT Real-sucrose 12 Yr+ IM 11/10/2022   • COVID-19 Pfizer mRNA vacc PF real-sucrose 12 yr and older (Comirnaty) 11/19/2024   • DTaP 01/22/1992, 08/24/1993, 01/13/1997   • DTaP 5 01/22/1992, 04/01/1992, 05/20/1992, 08/24/1993, 01/13/1997   • Hep B, Adolescent or Pediatric 03/01/1993, 04/05/1993   • Hep B, adult 03/01/1993, 04/05/1993, 10/11/1993   • HiB 04/01/1992   • Hib (PRP-OMP) 01/22/1992, 04/01/1992, 05/20/1992, 05/25/1993   • INFLUENZA 12/19/2015, 10/19/2016, 10/26/2017, 10/18/2018, 10/07/2019, 12/08/2020, 11/04/2022   • IPV 02/22/1992, 04/22/1992, 08/24/1993, 01/13/1997   • MMR 03/01/1993, 10/21/1997   • Measles 05/20/1992   • Meningococcal MCV4P 06/21/2010   • Meningococcal, Unknown Serogroups 06/21/2010   • Td (adult), adsorbed 05/26/2006   • Tdap 12/02/2024   • Tuberculin Skin Test 06/21/2010   • Tuberculin Skin Test-PPD Intradermal 06/21/2010     Objective   /74 (BP Location: Left arm, Patient Position: Sitting, Cuff Size: Large)   Pulse 75   Temp 98.2 °F (36.8 °C)   Ht 5' 7\" (1.702 m)   Wt 90.3 kg (199 lb)   SpO2 100%   BMI 31.17 kg/m²     Physical Exam  Constitutional:       General: She is not in acute distress.     Appearance: She is well-developed. She is not ill-appearing, toxic-appearing or diaphoretic.   HENT:    "   Right Ear: External ear normal. There is no impacted cerumen.      Left Ear: External ear normal. There is no impacted cerumen.   Eyes:      Conjunctiva/sclera: Conjunctivae normal.   Cardiovascular:      Rate and Rhythm: Normal rate and regular rhythm.      Heart sounds: Normal heart sounds. No murmur heard.  Pulmonary:      Effort: Pulmonary effort is normal. No respiratory distress.      Breath sounds: Normal breath sounds. No stridor. No wheezing or rales.   Musculoskeletal:      Cervical back: Neck supple.   Lymphadenopathy:      Cervical: Cervical adenopathy present.   Neurological:      Mental Status: She is alert and oriented to person, place, and time.   Psychiatric:         Mood and Affect: Mood normal.         Behavior: Behavior normal.         Thought Content: Thought content normal.         Judgment: Judgment normal.

## 2025-01-03 RX ORDER — DEXTROAMPHETAMINE/AMPHETAMINE 15 MG
15 CAPSULE, EXT RELEASE 24 HR ORAL EVERY MORNING
Qty: 30 CAPSULE | Refills: 0 | Status: SHIPPED | OUTPATIENT
Start: 2025-01-03

## 2025-01-03 RX ORDER — DEXTROAMPHETAMINE SACCHARATE, AMPHETAMINE ASPARTATE, DEXTROAMPHETAMINE SULFATE AND AMPHETAMINE SULFATE 2.5; 2.5; 2.5; 2.5 MG/1; MG/1; MG/1; MG/1
10 TABLET ORAL DAILY PRN
Qty: 15 TABLET | Refills: 0 | Status: SHIPPED | OUTPATIENT
Start: 2025-01-03

## 2025-01-10 ENCOUNTER — HOSPITAL ENCOUNTER (OUTPATIENT)
Dept: RADIOLOGY | Age: 35
Discharge: HOME | End: 2025-01-10
Payer: COMMERCIAL

## 2025-01-10 DIAGNOSIS — D35.2 PITUITARY ADENOMA (HCC): ICD-10-CM

## 2025-01-10 DIAGNOSIS — E23.6 RATHKE'S POUCH CYST (HCC): ICD-10-CM

## 2025-01-10 PROCEDURE — A9585 GADOBUTROL INJECTION: HCPCS | Performed by: INTERNAL MEDICINE

## 2025-01-10 PROCEDURE — 70553 MRI BRAIN STEM W/O & W/DYE: CPT

## 2025-01-10 RX ORDER — GADOBUTROL 604.72 MG/ML
9 INJECTION INTRAVENOUS
Status: COMPLETED | OUTPATIENT
Start: 2025-01-10 | End: 2025-01-10

## 2025-01-10 RX ADMIN — GADOBUTROL 9 ML: 604.72 INJECTION INTRAVENOUS at 12:29

## 2025-01-14 ENCOUNTER — TELEMEDICINE (OUTPATIENT)
Dept: NEUROLOGY | Facility: CLINIC | Age: 35
End: 2025-01-14
Payer: COMMERCIAL

## 2025-01-14 ENCOUNTER — TELEPHONE (OUTPATIENT)
Dept: NEUROLOGY | Facility: CLINIC | Age: 35
End: 2025-01-14

## 2025-01-14 DIAGNOSIS — I72.0 CAROTID ANEURYSM, LEFT (HCC): ICD-10-CM

## 2025-01-14 DIAGNOSIS — G43.709 CHRONIC MIGRAINE WITHOUT AURA WITHOUT STATUS MIGRAINOSUS, NOT INTRACTABLE: Primary | ICD-10-CM

## 2025-01-14 DIAGNOSIS — E23.6 RATHKE'S POUCH CYST (HCC): ICD-10-CM

## 2025-01-14 PROCEDURE — 99212 OFFICE O/P EST SF 10 MIN: CPT | Performed by: PHYSICIAN ASSISTANT

## 2025-01-14 NOTE — PROGRESS NOTES
history of prior left aneurysm clipping in 2014 who presents for neurologic follow-up for chronic migraines.  Patient is a manager of a web development team     Patient's headaches are better with Botox-prior to botox she would have daily pain on the left side of her head. With botox she has pretty much complete relief. She has noticed that she start with some discomfort about a week or so before her botox is due but this isn't all the time.   She has chronic nerve pain along the scar line. At this time, Headaches well controlled, patient not describing having any significant issues.  The sensitivity around her scar side well controlled.       Sleep disturbance: chronic issue but needs to take doxepin      Current medical illnesses:  QTc 2/26/2015 438 ms  History Tobacco use: quit  Pituitary adenoma  Left carotid aneurysm  Cerebral aneurysm  Depression  ADHD  Vitamin-D deficiency     What medications do you take or have you taken for your headaches?   Current Preventive:   Botox  Adderall  Doxepin  Vitamin D3     Current Abortive:   advil   Nurtec  Ondansetron  Unable to use triptans due the anenursm, and clip     Prior Preventive:   nortriptyline  Verapamil  Cyclobenzaprine  Vitamin B2, magnesium  Gabapentin (caused diplopia)     Prior Abortive:   Aleve, Advil, Toradol      What is your current pain level ? 0/10  How often do the headaches occur?  has some mild to moderate annoying pain the week her botox is due otherwise very rare migraines     Are you ever headache free? yes     Headache history with updates:  Alternative therapies used in the past for headaches?  None  Headache are worse if the patient:  None  Headache triggers:  Computer screen legs, bright or artificial light     Aura/warning and how long does it last ? Yes, in the past with her previous birth control she was see a prism  What time of the day do the headaches start?  Varies  How long do the headaches last?  A few hours with Advil  Describe  your usual headache ?  Sharp, throbbing, pounding  Where is your headache located?  Left frontal and left temple     What is the intensity of pain?  Average pain 4-5/10 but this is more rare, has not had an intense migraine with botox     Associated symptoms:   Decrease of appetite, nausea  Photophobia, sensitivity to smell   Problem with concentration  prefer to be alone and in a dark room, unable to work     Number of days missed per month because of headaches:  Work (or school) days: none  Social or Family activities: rare     What time of the year do headaches occur more frequently? do not seem to be related to any time of the year  Have you seen someone else for headaches or pain? Yes, PCP  Have you had trigger point injection performed and how often? No  Have you had Botox injection performed and how often? Yes   Have you had epidural injections or transforaminal injections performed? No     Have you used CBD or THC for your headaches and how often? Yes, has medical card     Are you current pregnant or planning on getting pregnant? No, not at this time   Have you ever had any Brain imaging? yes      4/28/2015 MRI Brain  Stable treatment related changes status post right ICA bifurcation   aneurysm clipping , No acute infarction, intracranial hemorrhage or enhancing mass   lesion. Stable cystic pituitary microadenoma versus Rathke's cleft cyst   superiorly in the right side of the anterior lobe of the pituitary   gland, unchanged.     1/10/2025 MRI brain and pituitary  1.  No substantial change in 0.8 x 0.4 cm hypoenhancing focus in the right aspect of the pituitary gland. Given stability since 2015, benign process such as Rathke's cleft cyst is favored. Cystic microadenoma felt less likely however consider correlation   with laboratory parameters.  2.  Otherwise stable appearance of the brain status post left ICA aneurysm clipping. No acute findings.      I personally reviewed these images.     Reviewed old  notes from physician seen in the past- see above HPI for summary of previous encounters. .     With botox has had a reduction of at least 7 migraine days with less abortive medication, less ER visits which correlates to headache diary

## 2025-01-14 NOTE — PROGRESS NOTES
Virtual Regular Visit  Name: Susan Love      : 1990      MRN: [id number]  Encounter Provider: Sharita Cordero PA-C  Encounter Date: 2025   Encounter department: [facility]      Verification of patient location:  Patient is located at Home in the following state in which I hold an active license [place] :  Assessment & Plan  Chronic migraine without aura without status migrainosus, not intractable  Continue Botox every 4 months as has dramatically reduced her migraine headaches and daily had pain  If develop a headache take Nurtec 75 mg.  Use Advil at onset of migraine if needed         Rathke's pouch cyst (HCC)  MRI stable       Carotid aneurysm, left (HCC)  Left carotid terminus aneurysm; clipping in             Encounter provider Sharita Cordero PA-C    The patient was identified by name and date of birth. Susan Love was informed that this is a telemedicine visit and that the visit is being conducted through the Epic Embedded platform. She agrees to proceed..  My office door was closed. No one else was in the room.  She acknowledged consent and understanding of privacy and security of the video platform. The patient has agreed to participate and understands they can discontinue the visit at any time.    Patient is aware this is a billable service.     History of Present Illness     HPI  Patient has a history of prior left aneurysm clipping in  who presents for neurologic follow-up for chronic migraines.  Patient is a manager of a web development team     Patient's headaches are better with Botox-prior to botox she would have daily pain on the left side of her head. With botox she has pretty much complete relief. She has noticed that she start with some discomfort about a week or so before her botox is due but this isn't all the time.   She has chronic nerve pain along the scar line. At this time, Headaches well controlled, patient not describing having any significant  issues.  The sensitivity around her scar side well controlled.       Sleep disturbance: chronic issue but needs to take doxepin      Current medical illnesses:  QTc 2/26/2015 438 ms  History Tobacco use: quit  Pituitary adenoma  Left carotid aneurysm  Cerebral aneurysm  Depression  ADHD  Vitamin-D deficiency     What medications do you take or have you taken for your headaches?   Current Preventive:   Botox  Adderall  Doxepin  Vitamin D3     Current Abortive:   advil   Nurtec  Ondansetron  Unable to use triptans due the anenursm, and clip     Prior Preventive:   nortriptyline  Verapamil  Cyclobenzaprine  Vitamin B2, magnesium  Gabapentin (caused diplopia)     Prior Abortive:   Aleve, Advil, Toradol      What is your current pain level ? 0/10  How often do the headaches occur?  more rare, has some mild to moderate annoying pain the week her botox is due otherwise very rare migraines     Are you ever headache free? yes     Headache history with updates:  Alternative therapies used in the past for headaches?  None  Headache are worse if the patient:  None  Headache triggers:  Computer screen legs, bright or artificial light     Aura/warning and how long does it last ? Yes, in the past with her previous birth control she was see a prism  What time of the day do the headaches start?  Varies  How long do the headaches last?  A few hours with Advil  Describe your usual headache ?  Sharp, throbbing, pounding  Where is your headache located?  Left frontal and left temple     What is the intensity of pain?  Average pain 2-4/10 but this is more rare, has not had an intense migraine with botox     Associated symptoms:   Decrease of appetite, nausea  Photophobia, sensitivity to smell   Problem with concentration  prefer to be alone and in a dark room, unable to work     Number of days missed per month because of headaches:  Work (or school) days: none  Social or Family activities: rare     What time of the year do headaches  occur more frequently? do not seem to be related to any time of the year  Have you seen someone else for headaches or pain? Yes, PCP  Have you had trigger point injection performed and how often? No  Have you had Botox injection performed and how often? Yes   Have you had epidural injections or transforaminal injections performed? No     Have you used CBD or THC for your headaches and how often? Yes, has medical card     Are you current pregnant or planning on getting pregnant? No, not at this time   Have you ever had any Brain imaging? yes      4/28/2015 MRI Brain  Stable treatment related changes status post right ICA bifurcation   aneurysm clipping , No acute infarction, intracranial hemorrhage or enhancing mass   lesion. Stable cystic pituitary microadenoma versus Rathke's cleft cyst   superiorly in the right side of the anterior lobe of the pituitary   gland, unchanged.     1/10/2025 MRI brain and pituitary  1.  No substantial change in 0.8 x 0.4 cm hypoenhancing focus in the right aspect of the pituitary gland. Given stability since 2015, benign process such as Rathke's cleft cyst is favored. Cystic microadenoma felt less likely however consider correlation with laboratory parameters.  2.  Otherwise stable appearance of the brain status post left ICA aneurysm clipping. No acute findings.      I personally reviewed these images.     Reviewed old notes from physician seen in the past- see above HPI for summary of previous encounters. .     With botox has had a reduction of at least 7 migraine days with less abortive medication, less ER visits which correlates to headache diary     Review of Systems   Constitutional: Negative.    HENT: Negative.     Eyes: Negative.    Respiratory: Negative.     Cardiovascular: Negative.    Gastrointestinal: Negative.    Endocrine: Negative.    Genitourinary: Negative.    Musculoskeletal: Negative.    Skin: Negative.    Allergic/Immunologic: Negative.    Neurological:  Positive  for headaches.   Hematological: Negative.    Psychiatric/Behavioral: Negative.     I personally reviewed and updated the ROS that was entered by the medical assistant      Objective   There were no vitals taken for this visit.    Physical Exam  CONSTITUTIONAL: Well developed, well nourished, well groomed. No dysmorphic features.     HEENT:  Normocephalic atraumatic.    Chest:  Respirations regular and unlabored.    Psychiatric:  Normal behavior and appropriate affect      MENTAL STATUS  Orientation: Alert and oriented x 3  Fund of knowledge: Intact.    Visit Time  I have spent a total time of 19 minutes in caring for this patient on the day of the visit/encounter including Diagnostic results, Prognosis, Impressions, Counseling / Coordination of care, Documenting in the medical record, Reviewing / ordering tests, medicine, procedures  , and Obtaining or reviewing history  .

## 2025-01-14 NOTE — ASSESSMENT & PLAN NOTE
Continue Botox every 4 months as has dramatically reduced her migraine headaches and daily had pain  If develop a headache take Nurtec 75 mg.  Use Advil at onset of migraine if needed

## 2025-01-14 NOTE — TELEPHONE ENCOUNTER
Called patient to schedule her 1 year f/u with Sharita patient was unable to answer the phone, Left a  with a call back number.

## 2025-01-26 ENCOUNTER — RESULTS FOLLOW-UP (OUTPATIENT)
Dept: INTERNAL MEDICINE CLINIC | Facility: CLINIC | Age: 35
End: 2025-01-26

## 2025-01-30 ENCOUNTER — OFFICE VISIT (OUTPATIENT)
Dept: INTERNAL MEDICINE CLINIC | Facility: CLINIC | Age: 35
End: 2025-01-30
Payer: COMMERCIAL

## 2025-01-30 VITALS
TEMPERATURE: 98.5 F | SYSTOLIC BLOOD PRESSURE: 132 MMHG | OXYGEN SATURATION: 99 % | HEART RATE: 112 BPM | HEIGHT: 67 IN | BODY MASS INDEX: 30.04 KG/M2 | DIASTOLIC BLOOD PRESSURE: 88 MMHG | WEIGHT: 191.4 LBS

## 2025-01-30 DIAGNOSIS — E66.811 CLASS 1 OBESITY DUE TO EXCESS CALORIES WITHOUT SERIOUS COMORBIDITY WITH BODY MASS INDEX (BMI) OF 32.0 TO 32.9 IN ADULT: ICD-10-CM

## 2025-01-30 DIAGNOSIS — G43.709 CHRONIC MIGRAINE WITHOUT AURA WITHOUT STATUS MIGRAINOSUS, NOT INTRACTABLE: Primary | ICD-10-CM

## 2025-01-30 DIAGNOSIS — E66.09 CLASS 1 OBESITY DUE TO EXCESS CALORIES WITHOUT SERIOUS COMORBIDITY WITH BODY MASS INDEX (BMI) OF 32.0 TO 32.9 IN ADULT: ICD-10-CM

## 2025-01-30 PROCEDURE — 99214 OFFICE O/P EST MOD 30 MIN: CPT | Performed by: INTERNAL MEDICINE

## 2025-01-30 NOTE — ASSESSMENT & PLAN NOTE
Headaches after exercise started 2 weeks ago  Normal exam and symptoms improved with ibuprofen  Recent imaging to assess pituitary cyst showed that it was stable, stable appearance of the brain status post left ICA aneurysm clipping  She can take ibuprofen as needed and Nurtec as needed  Follow-up with neurology if headaches do not improve

## 2025-01-30 NOTE — PROGRESS NOTES
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, stable appearance of the brain status post left ICA aneurysm clipping  She can take ibuprofen as needed and Nurtec as needed  Follow-up with neurology if headaches do not improve       Class 1 obesity due to excess calories without serious comorbidity with body mass index (BMI) of 32.0 to 32.9 in adult  Tolerating Wegovy and she has been on 1.7 mg for more than a month  I do not suspect that it has anything to do with her headaches so we will continue at this time          History of Present Illness     Known migraines on as needed ibuprofen, left carotid terminus aneurysm clipped in   She also has Nurtec from neurology   Reports headaches after exercise started 2 weeks ago and has continued in spite of staying hydrated eating regularly  Stabbing ,sometimes a generalized soreness, no light or noise sensitivity, visual changes, nausea  She stopped exercising for a week and did not experience the headaches but resumed a few days ago and they started again  Recent brain MRI on January 10 to monitor pituitary cyst which was stable in size, no acute findings  Current headache is 4 out of 10 that started 4 days ago.  She took ibuprofen then which helped.  She has not taken the Nurtec  She has tried Nurtec in the past and had some relief    Headache    Review of Systems   Constitutional:  Negative for chills and fever.   HENT:  Negative for rhinorrhea.    Eyes:  Negative for visual disturbance.   Respiratory:  Negative for cough.    Cardiovascular:  Negative for chest pain and  palpitations.   Gastrointestinal:  Negative for constipation, diarrhea, nausea and vomiting.   Neurological:  Positive for headaches. Negative for dizziness, weakness and numbness.     Past Medical History:   Diagnosis Date   • Allergic    • Anxiety     Saw a therapist for 1 year at the time   • Depression    • FH: brain aneurysm    • Headache(784.0)     History of headaches; mainly controlled at this point   • Herpes    • Hyperprolactinemia (HCC) 2015   • Hypertension  (last)    Blood pressure goes through periods where it is high for a few months then comes back down   • Migraine    • Obesity      Past Surgical History:   Procedure Laterality Date   • CEREBRAL ANEURYSM REPAIR      24yo   • CHOLECYSTECTOMY      20yo   • EYE SURGERY  ?    Clogged tear duct     Family History   Problem Relation Age of Onset   • Hypertension Mother    • Migraines Mother    • Diabetes Mother    • Cancer Mother         Endometrial   • Hypertension Father    • Arthritis Father    • Mental illness Maternal Grandmother         Anxiety, phobias, depression   • Depression Maternal Grandmother    • Thrombosis Maternal Grandmother    • Autoimmune disease Maternal Grandmother         Lupus (likely cause of interstitial lung disease)   • Anxiety disorder Maternal Grandmother    • Diabetes Maternal Grandfather    • Skin cancer Maternal Grandfather    • Hypertension Maternal Grandfather    • Stroke Maternal Grandfather    • Heart disease Maternal Grandfather    • Cancer Maternal Grandfather         Skin cancer   • Alcohol abuse Paternal Grandmother    • Stroke Paternal Grandmother         Cause of death   • Thrombosis Paternal Grandmother         Unsure, but she was on blood thinners   • Hypertension Paternal Grandfather      Social History     Tobacco Use   • Smoking status: Former     Current packs/day: 0.00     Types: Cigarettes     Quit date: 2016     Years since quittin.0   • Smokeless tobacco: Never   • Tobacco  comments:     Smoked about 1 pack per week for about 1-1.5 yr   Vaping Use   • Vaping status: Never Used   Substance and Sexual Activity   • Alcohol use: Yes     Comment: Socially / not consistent   • Drug use: No   • Sexual activity: Yes     Partners: Male     Birth control/protection: I.U.D.     Comment: Mirena 3/2015     Current Outpatient Medications on File Prior to Visit   Medication Sig   • ADDERALL XR, 15MG, 15 MG 24 hr capsule Take 1 capsule (15 mg total) by mouth every morning Max Daily Amount: 15 mg   • amphetamine-dextroamphetamine (ADDERALL, 10MG,) 10 mg tablet Take 1 tablet (10 mg total) by mouth daily as needed (difficulty focusing) Max Daily Amount: 10 mg   • Botox 100 units INJECT UP  UNITS IN THE MUSCLE INTO VARIOUS SITES OF THE HEAD AND NECK ONCE EVERY 3 MONTHS   • cholecalciferol (VITAMIN D3) 1,000 units tablet Take 1,000 Units by mouth daily   • Doxepin HCl 6 MG TABS Take 6 mg by mouth in the morning   • levonorgestrel (MIRENA) 20 MCG/24HR IUD by Intrauterine route   • ondansetron (ZOFRAN) 4 mg tablet Take 1 tablet (4 mg total) by mouth every 8 (eight) hours as needed for nausea or vomiting   • rimegepant sulfate (Nurtec) 75 mg TBDP Take 1 tablet (75 mg total) by mouth as needed (migraine) Limit of 1 in 24 hours   • Semaglutide-Weight Management (Wegovy) 1.7 MG/0.75ML INJECT 1.7 MG UNDER THE SKIN WEEKLY   • valACYclovir (VALTREX) 1,000 mg tablet Take one tablet/day, as needed     Allergies   Allergen Reactions   • Cobalt    • Edetic Acid    • Ethylenediamine    • Mercaptopurine      MERCAPTO MIX   • Thimerosal (Thiomersal)    • Nickel Itching and Rash     Immunization History   Administered Date(s) Administered   • COVID-19 Moderna mRNA Vaccine 12 Yr+ 50 mcg/0.5 mL (Spikevax) 12/04/2023   • COVID-19 PFIZER VACCINE 0.3 ML IM 03/16/2021, 04/06/2021, 11/23/2021   • COVID-19 Pfizer Vac BIVALENT Real-sucrose 12 Yr+ IM 11/10/2022   • COVID-19 Pfizer mRNA vacc PF real-sucrose 12 yr and older  "(Comirnaty) 11/19/2024   • DTaP 01/22/1992, 08/24/1993, 01/13/1997   • DTaP 5 01/22/1992, 04/01/1992, 05/20/1992, 08/24/1993, 01/13/1997   • Hep B, Adolescent or Pediatric 03/01/1993, 04/05/1993   • Hep B, adult 03/01/1993, 04/05/1993, 10/11/1993   • HiB 04/01/1992   • Hib (PRP-OMP) 01/22/1992, 04/01/1992, 05/20/1992, 05/25/1993   • INFLUENZA 12/19/2015, 10/19/2016, 10/26/2017, 10/18/2018, 10/07/2019, 12/08/2020, 11/04/2022   • IPV 02/22/1992, 04/22/1992, 08/24/1993, 01/13/1997   • Influenza, seasonal, injectable, preservative free 11/18/2024   • MMR 03/01/1993, 10/21/1997   • Measles 05/20/1992   • Meningococcal MCV4P 06/21/2010   • Meningococcal, Unknown Serogroups 06/21/2010   • Td (adult), adsorbed 05/26/2006   • Tdap 12/02/2024   • Tuberculin Skin Test 06/21/2010   • Tuberculin Skin Test-PPD Intradermal 06/21/2010     Objective   /88 (BP Location: Left arm, Patient Position: Sitting, Cuff Size: Standard)   Pulse (!) 112   Temp 98.5 °F (36.9 °C)   Ht 5' 7\" (1.702 m)   Wt 86.8 kg (191 lb 6.4 oz)   SpO2 99%   BMI 29.98 kg/m²     Physical Exam  Constitutional:       General: She is not in acute distress.     Appearance: She is well-developed. She is not ill-appearing, toxic-appearing or diaphoretic.   Eyes:      Extraocular Movements: Extraocular movements intact.      Conjunctiva/sclera: Conjunctivae normal.      Pupils: Pupils are equal, round, and reactive to light.   Cardiovascular:      Rate and Rhythm: Normal rate and regular rhythm.      Heart sounds: Normal heart sounds. No murmur heard.  Pulmonary:      Effort: Pulmonary effort is normal. No respiratory distress.      Breath sounds: Normal breath sounds. No stridor. No wheezing or rales.   Abdominal:      General: There is no distension.      Palpations: Abdomen is soft. There is no mass.      Tenderness: There is no abdominal tenderness. There is no guarding or rebound.   Musculoskeletal:      Cervical back: Neck supple.      Right lower leg: " No edema.      Left lower leg: No edema.   Neurological:      Mental Status: She is alert and oriented to person, place, and time.      Cranial Nerves: No facial asymmetry.   Psychiatric:         Mood and Affect: Mood normal.         Behavior: Behavior normal.         Thought Content: Thought content normal.         Judgment: Judgment normal.

## 2025-01-30 NOTE — ASSESSMENT & PLAN NOTE
Tolerating Wegovy and she has been on 1.7 mg for more than a month  I do not suspect that it has anything to do with her headaches so we will continue at this time

## 2025-02-04 DIAGNOSIS — F90.0 ATTENTION DEFICIT HYPERACTIVITY DISORDER (ADHD), PREDOMINANTLY INATTENTIVE TYPE: ICD-10-CM

## 2025-02-05 RX ORDER — DEXTROAMPHETAMINE SACCHARATE, AMPHETAMINE ASPARTATE, DEXTROAMPHETAMINE SULFATE AND AMPHETAMINE SULFATE 2.5; 2.5; 2.5; 2.5 MG/1; MG/1; MG/1; MG/1
10 TABLET ORAL DAILY PRN
Qty: 15 TABLET | Refills: 0 | Status: SHIPPED | OUTPATIENT
Start: 2025-02-05

## 2025-02-05 RX ORDER — DEXTROAMPHETAMINE/AMPHETAMINE 15 MG
15 CAPSULE, EXT RELEASE 24 HR ORAL EVERY MORNING
Qty: 30 CAPSULE | Refills: 0 | Status: SHIPPED | OUTPATIENT
Start: 2025-02-05

## 2025-02-25 ENCOUNTER — PROCEDURE VISIT (OUTPATIENT)
Dept: NEUROLOGY | Facility: CLINIC | Age: 35
End: 2025-02-25
Payer: COMMERCIAL

## 2025-02-25 VITALS — DIASTOLIC BLOOD PRESSURE: 82 MMHG | TEMPERATURE: 98.1 F | SYSTOLIC BLOOD PRESSURE: 134 MMHG

## 2025-02-25 DIAGNOSIS — D35.2 PITUITARY ADENOMA (HCC): ICD-10-CM

## 2025-02-25 DIAGNOSIS — G44.84 EXERTIONAL HEADACHE: ICD-10-CM

## 2025-02-25 DIAGNOSIS — G43.709 CHRONIC MIGRAINE WITHOUT AURA WITHOUT STATUS MIGRAINOSUS, NOT INTRACTABLE: Primary | ICD-10-CM

## 2025-02-25 DIAGNOSIS — I72.9 ANEURYSM (HCC): ICD-10-CM

## 2025-02-25 PROCEDURE — 99212 OFFICE O/P EST SF 10 MIN: CPT | Performed by: PHYSICIAN ASSISTANT

## 2025-02-25 PROCEDURE — 64615 CHEMODENERV MUSC MIGRAINE: CPT | Performed by: PHYSICIAN ASSISTANT

## 2025-02-25 RX ORDER — INDOMETHACIN 50 MG/1
CAPSULE ORAL
Qty: 90 CAPSULE | Refills: 1 | Status: SHIPPED | OUTPATIENT
Start: 2025-02-25

## 2025-02-25 RX ORDER — SUCRALFATE 1 G/1
1 TABLET ORAL 3 TIMES DAILY PRN
Qty: 90 TABLET | Refills: 0 | Status: SHIPPED | OUTPATIENT
Start: 2025-02-25

## 2025-02-25 NOTE — PROGRESS NOTES
"Universal Protocol   procedure performed by consultantConsent: Verbal consent obtained. Written consent obtained.  Risks and benefits: risks, benefits and alternatives were discussed  Consent given by: patient  Time out: Immediately prior to procedure a \"time out\" was called to verify the correct patient, procedure, equipment, support staff and site/side marked as required.  Patient understanding: patient states understanding of the procedure being performed  Patient consent: the patient's understanding of the procedure matches consent given  Procedure consent: procedure consent matches procedure scheduled  Relevant documents: relevant documents present and verified  Patient identity confirmed: verbally with patient      Chemodenervation     Date/Time  2/25/2025 2:30 PM     Performed by  Sharita Cordero PA-C   Authorized by  Sharita Cordero PA-C     Pre-procedure details      Preparation: Patient was prepped and draped in usual sterile fashion     Anesthesia  (see MAR for exact dosages):     Anesthesia method:  None   Botox      Botox Type:  Type A    Brand:  Botox    mL's of Botulinum Toxin:  100    mL's of preservative free sterile saline:  2    Final Concentration per CC:  50 units    Needle Gauge:  30 G 2.5 inch   Procedures      Botox Procedures: chronic headache     Injection Location      Head / Face:  R frontalis, L frontalis and L temporalis    L lateral frontalis:  45 unit(s)    R lateral frontalis:  10 unit(s)    Comments:  Around scar    L medial frontalis:  0 unit(s)    R medial frontalis:  5 unit(s)    Comments:  Around scar    L temporalis injection amount:  40 unit(s)    Comments:  Around scar   Total Units      Total units used:  100   Post-procedure details      Chemodenervation:  Chronic migraine    Facial Nerve Location::  Bilateral facial nerve    Patient tolerance of procedure:  Tolerated well, no immediate complications   Comments       All medically necessary           While in the office today " patient discussed that she has NEW onset exertional headache since the beginning of this year, January 2025.  Patient states that she gets a significant headaches within 2 hours of exertion and then will have the follow days even worse but presents much faster.  Has never had this issue prior.  Patient has history of brain aneurysm s/p left ICA clipping 5/23/2014.  In addition, patient had been ill with both respiratory and GI issues prior to the change and new onset headaches  I have spent a total time of 11 minutes on 02/25/25 in caring for this patient, excluding pre-op, procedure and post-op time, including Diagnostic results, Prognosis, Instructions for management, Impressions, Counseling / Coordination of care, Documenting in the medical record, Reviewing/placing orders in the medical record (including tests, medications, and/or procedures), and Obtaining or reviewing history  .

## 2025-02-25 NOTE — ASSESSMENT & PLAN NOTE
Due to new onset of exertional headaches with patient history of status post left ICA clipping in 2024 and 2 illnesses prior to new onset exertional headache, we will proceed with MRA and MRV of the head to ensure no nefarious structural abnormalities including, but not limited to, new aneurysm, dural venous sinus thrombus or other abnormalities which could be causing these exertional headaches    Patient is to use indomethacin 50 mg 1 hour prior to exercise with sucralfate 30 minutes prior if this fails, patient will reach out

## 2025-03-05 DIAGNOSIS — F90.0 ATTENTION DEFICIT HYPERACTIVITY DISORDER (ADHD), PREDOMINANTLY INATTENTIVE TYPE: ICD-10-CM

## 2025-03-06 DIAGNOSIS — F90.0 ATTENTION DEFICIT HYPERACTIVITY DISORDER (ADHD), PREDOMINANTLY INATTENTIVE TYPE: ICD-10-CM

## 2025-03-06 RX ORDER — DEXTROAMPHETAMINE SACCHARATE, AMPHETAMINE ASPARTATE, DEXTROAMPHETAMINE SULFATE AND AMPHETAMINE SULFATE 2.5; 2.5; 2.5; 2.5 MG/1; MG/1; MG/1; MG/1
10 TABLET ORAL DAILY PRN
Qty: 15 TABLET | Refills: 0 | Status: SHIPPED | OUTPATIENT
Start: 2025-03-06

## 2025-03-06 RX ORDER — DEXTROAMPHETAMINE/AMPHETAMINE 15 MG
15 CAPSULE, EXT RELEASE 24 HR ORAL EVERY MORNING
Qty: 30 CAPSULE | Refills: 0 | Status: SHIPPED | OUTPATIENT
Start: 2025-03-06

## 2025-03-06 RX ORDER — DEXTROAMPHETAMINE/AMPHETAMINE 15 MG
15 CAPSULE, EXT RELEASE 24 HR ORAL EVERY MORNING
Qty: 30 CAPSULE | Refills: 0 | Status: SHIPPED | OUTPATIENT
Start: 2025-03-06 | End: 2025-03-06 | Stop reason: SDUPTHER

## 2025-03-07 ENCOUNTER — TELEPHONE (OUTPATIENT)
Age: 35
End: 2025-03-07

## 2025-03-07 NOTE — TELEPHONE ENCOUNTER
PA ADDERALL XR 15MG SUBMITTED     to [employer]     via    []CMM-KEY:    [x]Surescripts-Case ID # [id number]   []Availity-Auth ID #  NDC #    []Faxed to plan   []Other website    []Phone call Case ID #      []PA sent as URGENT    All office notes, labs and other pertaining documents and studies sent. Clinical questions answered. Awaiting determination from insurance company.     Turnaround time for your insurance to make a decision on your Prior Authorization can take 7-21 business days.

## 2025-03-07 NOTE — LETTER
"ATTN: Appeals Dept     URGENT REVIEW    Patient: Susan Love :1990 Member ID: [id number]    Re: Request for Reconsideration of ADDERALL XR 15MG       To Whom It May Concern:   Susan Love 1990 was denied coverage for the medication ADDERALL XR 15MG on 3/17/25. The denial reason was stated as not covered due to not meeting insurance criteria. I am requesting a redetermination of the denial of coverage due to my patient has an adverse reaction to the ingredients in the generic. The generic form makes her feel \"non functional or spacey. She does not have these issues when she is taking the brand name.      In conclusion, please reconsider the denial of ADDERALL XR 15MG for my patient. I would appreciate prompt review of this appeal and approval of this FDA-approved medication. I can be reached by phone at [phone] or via fax at [phone] for additional information and discussion. Thank you.      Sincerely,   Lea Reyes, MD       "

## 2025-03-10 ENCOUNTER — TELEPHONE (OUTPATIENT)
Dept: INTERNAL MEDICINE CLINIC | Facility: CLINIC | Age: 35
End: 2025-03-10

## 2025-03-10 NOTE — TELEPHONE ENCOUNTER
Patient called the RX Refill Line. Message is being forwarded to the office.     Patient is requesting refill on Doxepin 6 mg patient states takes one tablet  a bedtime would like update Directions  .Patients PCP is now DR Reyes   [address] [address]  Phone: [phone]  Fax: [phone]     Any questions Please contact patient at  [phone]

## 2025-03-11 DIAGNOSIS — F33.0 MILD EPISODE OF RECURRENT MAJOR DEPRESSIVE DISORDER (HCC): Primary | ICD-10-CM

## 2025-03-11 RX ORDER — DOXEPIN 6 MG/1
6 TABLET, FILM COATED ORAL
Qty: 30 TABLET | Refills: 2 | Status: SHIPPED | OUTPATIENT
Start: 2025-03-11

## 2025-03-17 NOTE — TELEPHONE ENCOUNTER
PA ADDERALL XR 15MG DENIED    Reason:(Screenshot if applicable)        Message sent to office clinical pool Yes    Denial letter scanned into Media Yes    Appeal started No (Provider will need to decide if appeal is warranted and send clinical documentation to Prior Authorization Team for initiation.)    **Please follow up with your patient regarding denial and next steps**   100% of the time

## 2025-03-18 DIAGNOSIS — F90.0 ATTENTION DEFICIT HYPERACTIVITY DISORDER (ADHD), PREDOMINANTLY INATTENTIVE TYPE: ICD-10-CM

## 2025-03-18 RX ORDER — DEXTROAMPHETAMINE/AMPHETAMINE 15 MG
15 CAPSULE, EXT RELEASE 24 HR ORAL EVERY MORNING
Start: 2025-03-18 | End: 2025-03-20 | Stop reason: SDUPTHER

## 2025-03-18 NOTE — TELEPHONE ENCOUNTER
Pt called back.   She stated that this happens every year, she was told that information need to be sent to insurance stating she cannot take the generic form of adderall because of ingredients.  Pt said a list of ingredients should be sent to insurance.  Pt said she leaves [place] 3/23 for out of town for work.  Please advise and contact patient

## 2025-03-19 ENCOUNTER — DOCUMENTATION (OUTPATIENT)
Dept: INTERNAL MEDICINE CLINIC | Facility: CLINIC | Age: 35
End: 2025-03-19

## 2025-03-19 DIAGNOSIS — F90.0 ATTENTION DEFICIT HYPERACTIVITY DISORDER (ADHD), PREDOMINANTLY INATTENTIVE TYPE: Primary | ICD-10-CM

## 2025-03-19 RX ORDER — DEXTROAMPHETAMINE SACCHARATE, AMPHETAMINE ASPARTATE MONOHYDRATE, DEXTROAMPHETAMINE SULFATE AND AMPHETAMINE SULFATE 3.75; 3.75; 3.75; 3.75 MG/1; MG/1; MG/1; MG/1
15 CAPSULE, EXTENDED RELEASE ORAL EVERY MORNING
Qty: 7 CAPSULE | Refills: 0 | Status: SHIPPED | OUTPATIENT
Start: 2025-03-19 | End: 2025-03-21 | Stop reason: SDUPTHER

## 2025-03-19 NOTE — TELEPHONE ENCOUNTER
Dr. Reyes,    Are you carolyn to addend your note or provide a letter stating that the patient can not tolerate the generic form of the Adderall. I sent a message to the PA team to see if the case can be appealed.    The patient only has 3 days left of the medication and then goes on vacation. She is not comfortable taking the generic while being away or at all and is looking for an alternative.     Please advise.

## 2025-03-19 NOTE — TELEPHONE ENCOUNTER
I can send an urgent appeal but I can't guarantee is will be approved before she leaves her trip. If this is a reoccurring issue, it should be noted in every office visit that way when we submit, it is documented.

## 2025-03-19 NOTE — TELEPHONE ENCOUNTER
"Can this denial be appealed if we are able to provide documentation that the patient can not tolerate the generic form? The patient states that the generic form makes her feel \"non functional or spacey.    Please advise.  "

## 2025-03-19 NOTE — TELEPHONE ENCOUNTER
I entered a letter for the appeal.  Since there is a good chance that we will not get a response before she runs out in 3 days, I will send a week of the generic.  Please inform the patient.

## 2025-03-19 NOTE — TELEPHONE ENCOUNTER
PA ADDERALL XR 15MG APPEALED via     []CMM  []SS  [x]Letter sent to insurance via fax [phone]  []Other site or means      All necessary records sent. Will await response from insurance company    Turnaround time for a decision to be made on an appeal could take up to 30 business days

## 2025-03-19 NOTE — TELEPHONE ENCOUNTER
Relayed message as patient had not listened to her voicemail. States she is hesitant to take the generic especially while travelling. She was going to call her insurance and ask that the appeal be expedited since she goes thru this process every year and eventually they approve it. Will listen to her vm and call back with any questions.

## 2025-03-20 ENCOUNTER — APPOINTMENT (OUTPATIENT)
Dept: LAB | Age: 35
End: 2025-03-20
Payer: COMMERCIAL

## 2025-03-20 ENCOUNTER — TELEPHONE (OUTPATIENT)
Dept: OTHER | Facility: OTHER | Age: 35
End: 2025-03-20

## 2025-03-20 DIAGNOSIS — D35.2 PITUITARY ADENOMA (HCC): ICD-10-CM

## 2025-03-20 DIAGNOSIS — E66.811 CLASS 1 OBESITY DUE TO EXCESS CALORIES WITHOUT SERIOUS COMORBIDITY WITH BODY MASS INDEX (BMI) OF 32.0 TO 32.9 IN ADULT: ICD-10-CM

## 2025-03-20 DIAGNOSIS — E66.09 CLASS 1 OBESITY DUE TO EXCESS CALORIES WITHOUT SERIOUS COMORBIDITY WITH BODY MASS INDEX (BMI) OF 32.0 TO 32.9 IN ADULT: ICD-10-CM

## 2025-03-20 DIAGNOSIS — F90.0 ATTENTION DEFICIT HYPERACTIVITY DISORDER (ADHD), PREDOMINANTLY INATTENTIVE TYPE: ICD-10-CM

## 2025-03-20 LAB
ALBUMIN SERPL BCG-MCNC: 4.1 G/DL (ref 3.5–5)
ALP SERPL-CCNC: 51 U/L (ref 34–104)
ALT SERPL W P-5'-P-CCNC: 31 U/L (ref 7–52)
ANION GAP SERPL CALCULATED.3IONS-SCNC: 7 MMOL/L (ref 4–13)
AST SERPL W P-5'-P-CCNC: 20 U/L (ref 13–39)
BASOPHILS # BLD AUTO: 0.04 THOUSANDS/ÂΜL (ref 0–0.1)
BASOPHILS NFR BLD AUTO: 1 % (ref 0–1)
BILIRUB SERPL-MCNC: 0.45 MG/DL (ref 0.2–1)
BUN SERPL-MCNC: 10 MG/DL (ref 5–25)
CALCIUM SERPL-MCNC: 8.7 MG/DL (ref 8.4–10.2)
CHLORIDE SERPL-SCNC: 103 MMOL/L (ref 96–108)
CHOLEST SERPL-MCNC: 188 MG/DL (ref ?–200)
CO2 SERPL-SCNC: 26 MMOL/L (ref 21–32)
CREAT SERPL-MCNC: 0.8 MG/DL (ref 0.6–1.3)
EOSINOPHIL # BLD AUTO: 0.15 THOUSAND/ÂΜL (ref 0–0.61)
EOSINOPHIL NFR BLD AUTO: 3 % (ref 0–6)
ERYTHROCYTE [DISTWIDTH] IN BLOOD BY AUTOMATED COUNT: 14.6 % (ref 11.6–15.1)
FSH SERPL-ACNC: 2.7 MIU/ML
GFR SERPL CREATININE-BSD FRML MDRD: 97 ML/MIN/1.73SQ M
GLUCOSE P FAST SERPL-MCNC: 82 MG/DL (ref 65–99)
HCT VFR BLD AUTO: 40.7 % (ref 34.8–46.1)
HDLC SERPL-MCNC: 63 MG/DL
HGB BLD-MCNC: 13.3 G/DL (ref 11.5–15.4)
IMM GRANULOCYTES # BLD AUTO: 0.02 THOUSAND/UL (ref 0–0.2)
IMM GRANULOCYTES NFR BLD AUTO: 0 % (ref 0–2)
LDLC SERPL CALC-MCNC: 108 MG/DL (ref 0–100)
LH SERPL-ACNC: 8 MIU/ML
LYMPHOCYTES # BLD AUTO: 1.48 THOUSANDS/ÂΜL (ref 0.6–4.47)
LYMPHOCYTES NFR BLD AUTO: 28 % (ref 14–44)
MCH RBC QN AUTO: 29.7 PG (ref 26.8–34.3)
MCHC RBC AUTO-ENTMCNC: 32.7 G/DL (ref 31.4–37.4)
MCV RBC AUTO: 91 FL (ref 82–98)
MONOCYTES # BLD AUTO: 0.43 THOUSAND/ÂΜL (ref 0.17–1.22)
MONOCYTES NFR BLD AUTO: 8 % (ref 4–12)
NEUTROPHILS # BLD AUTO: 3.23 THOUSANDS/ÂΜL (ref 1.85–7.62)
NEUTS SEG NFR BLD AUTO: 60 % (ref 43–75)
NONHDLC SERPL-MCNC: 125 MG/DL
NRBC BLD AUTO-RTO: 0 /100 WBCS
PLATELET # BLD AUTO: 297 THOUSANDS/UL (ref 149–390)
PMV BLD AUTO: 8.2 FL (ref 8.9–12.7)
POTASSIUM SERPL-SCNC: 4.2 MMOL/L (ref 3.5–5.3)
PROLACTIN SERPL-MCNC: 40.56 NG/ML (ref 3.34–26.72)
PROT SERPL-MCNC: 7.1 G/DL (ref 6.4–8.4)
RBC # BLD AUTO: 4.48 MILLION/UL (ref 3.81–5.12)
SODIUM SERPL-SCNC: 136 MMOL/L (ref 135–147)
TRIGL SERPL-MCNC: 87 MG/DL (ref ?–150)
WBC # BLD AUTO: 5.35 THOUSAND/UL (ref 4.31–10.16)

## 2025-03-20 PROCEDURE — 85025 COMPLETE CBC W/AUTO DIFF WBC: CPT

## 2025-03-20 PROCEDURE — 80061 LIPID PANEL: CPT

## 2025-03-20 PROCEDURE — 82024 ASSAY OF ACTH: CPT

## 2025-03-20 PROCEDURE — 83520 IMMUNOASSAY QUANT NOS NONAB: CPT

## 2025-03-20 PROCEDURE — 83002 ASSAY OF GONADOTROPIN (LH): CPT

## 2025-03-20 PROCEDURE — 36415 COLL VENOUS BLD VENIPUNCTURE: CPT

## 2025-03-20 PROCEDURE — 80053 COMPREHEN METABOLIC PANEL: CPT

## 2025-03-20 PROCEDURE — 84305 ASSAY OF SOMATOMEDIN: CPT

## 2025-03-20 PROCEDURE — 84146 ASSAY OF PROLACTIN: CPT

## 2025-03-20 PROCEDURE — 83001 ASSAY OF GONADOTROPIN (FSH): CPT

## 2025-03-20 RX ORDER — DEXTROAMPHETAMINE/AMPHETAMINE 15 MG
15 CAPSULE, EXT RELEASE 24 HR ORAL EVERY MORNING
Qty: 30 CAPSULE | Refills: 0 | Status: SHIPPED | OUTPATIENT
Start: 2025-03-20 | End: 2025-03-21 | Stop reason: SDUPTHER

## 2025-03-20 NOTE — TELEPHONE ENCOUNTER
Patient needs the Adderall XR 15 mg prescription sent to a different Pharmacy. The pharmacy does not have it in stock and she tried calling another Pharmacy and they would not tell her if it was in stock because the medication is controlled. Patient is leaving on vacation on Sunday and would like this taken care of on Friday before she leaves.

## 2025-03-20 NOTE — TELEPHONE ENCOUNTER
Reason for call:   [x] Refill   [] Prior Auth  [] Other: not a duplicate, pt would like a new script for the full months supply since it was approved before her trip     Office:   [x] PCP/Provider - Dr Reyes  [] Specialty/Provider -     Medication: adderall XR- needs brand as that is what was approved by insurance     Dose/Frequency: 15 mg take 1 capsule every morning     Quantity: 30    Pharmacy: [facility] on [address]     [facility]   Does the patient have enough for 3 days?   [] Yes   [x] No - Send as HP to POD- is leaving Sunday and will need to  before she goes away    Mail Away Pharmacy   Does the patient have enough for 10 days?   [] Yes   [] No - Send as HP to POD

## 2025-03-21 ENCOUNTER — PATIENT MESSAGE (OUTPATIENT)
Dept: INTERNAL MEDICINE CLINIC | Facility: CLINIC | Age: 35
End: 2025-03-21

## 2025-03-21 ENCOUNTER — RESULTS FOLLOW-UP (OUTPATIENT)
Dept: NEUROLOGY | Facility: CLINIC | Age: 35
End: 2025-03-21

## 2025-03-21 DIAGNOSIS — F90.0 ATTENTION DEFICIT HYPERACTIVITY DISORDER (ADHD), PREDOMINANTLY INATTENTIVE TYPE: ICD-10-CM

## 2025-03-21 LAB — ACTH PLAS-MCNC: 28 PG/ML (ref 7.2–63.3)

## 2025-03-21 RX ORDER — DEXTROAMPHETAMINE SACCHARATE, AMPHETAMINE ASPARTATE MONOHYDRATE, DEXTROAMPHETAMINE SULFATE AND AMPHETAMINE SULFATE 3.75; 3.75; 3.75; 3.75 MG/1; MG/1; MG/1; MG/1
15 CAPSULE, EXTENDED RELEASE ORAL EVERY MORNING
Qty: 30 CAPSULE | Refills: 0 | Status: SHIPPED | OUTPATIENT
Start: 2025-03-21

## 2025-03-21 RX ORDER — DEXTROAMPHETAMINE/AMPHETAMINE 15 MG
15 CAPSULE, EXT RELEASE 24 HR ORAL EVERY MORNING
Qty: 30 CAPSULE | Refills: 0 | Status: SHIPPED | OUTPATIENT
Start: 2025-03-21

## 2025-03-21 NOTE — TELEPHONE ENCOUNTER
Please use the following pharmacy per patient:    [facility]/pharmacy #[id number] - [place] - [phone] [place] [id number]

## 2025-03-21 NOTE — PATIENT COMMUNICATION
Ssuan is calling let us know that her medication was sent to the correct pharmacy but she needs the brand name not the generic. She is requesting the Brand name   ADDERALL XR, 15MG, 15 MG 24 hr capsule   be sent to the [facility] in [place]:  [facility]/pharmacy #[id number] - [place] - [address]   [id number] [place] [id number]

## 2025-03-22 LAB — IGF-I SERPL-MCNC: 264 NG/ML (ref 84–281)

## 2025-03-25 DIAGNOSIS — G44.84 EXERTIONAL HEADACHE: ICD-10-CM

## 2025-03-25 DIAGNOSIS — I72.9 ANEURYSM (HCC): ICD-10-CM

## 2025-03-25 RX ORDER — SUCRALFATE 1 G/1
TABLET ORAL
Qty: 90 TABLET | Refills: 1 | Status: SHIPPED | OUTPATIENT
Start: 2025-03-25

## 2025-03-29 LAB — ALPHA SUBUNIT FREE SERPL-MCNC: 0.25 NG/ML

## 2025-03-31 ENCOUNTER — HOSPITAL ENCOUNTER (OUTPATIENT)
Dept: RADIOLOGY | Age: 35
Discharge: HOME | End: 2025-03-31
Payer: COMMERCIAL

## 2025-03-31 DIAGNOSIS — G44.84 EXERTIONAL HEADACHE: ICD-10-CM

## 2025-03-31 DIAGNOSIS — I72.9 ANEURYSM (HCC): ICD-10-CM

## 2025-03-31 PROCEDURE — 70544 MR ANGIOGRAPHY HEAD W/O DYE: CPT

## 2025-04-07 ENCOUNTER — OFFICE VISIT (OUTPATIENT)
Dept: INTERNAL MEDICINE CLINIC | Facility: CLINIC | Age: 35
End: 2025-04-07
Payer: COMMERCIAL

## 2025-04-07 VITALS
DIASTOLIC BLOOD PRESSURE: 82 MMHG | BODY MASS INDEX: 29.32 KG/M2 | TEMPERATURE: 98.7 F | OXYGEN SATURATION: 97 % | HEART RATE: 92 BPM | HEIGHT: 67 IN | WEIGHT: 186.8 LBS | SYSTOLIC BLOOD PRESSURE: 130 MMHG

## 2025-04-07 DIAGNOSIS — K21.9 GASTROESOPHAGEAL REFLUX DISEASE WITHOUT ESOPHAGITIS: ICD-10-CM

## 2025-04-07 DIAGNOSIS — F90.0 ATTENTION DEFICIT HYPERACTIVITY DISORDER (ADHD), PREDOMINANTLY INATTENTIVE TYPE: ICD-10-CM

## 2025-04-07 DIAGNOSIS — E66.09 CLASS 1 OBESITY DUE TO EXCESS CALORIES WITHOUT SERIOUS COMORBIDITY WITH BODY MASS INDEX (BMI) OF 32.0 TO 32.9 IN ADULT: Primary | ICD-10-CM

## 2025-04-07 DIAGNOSIS — F32.A DEPRESSION, UNSPECIFIED DEPRESSION TYPE: ICD-10-CM

## 2025-04-07 DIAGNOSIS — R07.89 LEFT-SIDED CHEST WALL PAIN: ICD-10-CM

## 2025-04-07 DIAGNOSIS — S93.402A SPRAIN OF LEFT ANKLE, UNSPECIFIED LIGAMENT, INITIAL ENCOUNTER: ICD-10-CM

## 2025-04-07 DIAGNOSIS — E66.811 CLASS 1 OBESITY DUE TO EXCESS CALORIES WITHOUT SERIOUS COMORBIDITY WITH BODY MASS INDEX (BMI) OF 32.0 TO 32.9 IN ADULT: Primary | ICD-10-CM

## 2025-04-07 DIAGNOSIS — G43.709 CHRONIC MIGRAINE WITHOUT AURA WITHOUT STATUS MIGRAINOSUS, NOT INTRACTABLE: ICD-10-CM

## 2025-04-07 PROCEDURE — 99214 OFFICE O/P EST MOD 30 MIN: CPT | Performed by: INTERNAL MEDICINE

## 2025-04-07 RX ORDER — FAMOTIDINE 20 MG/1
20 TABLET, FILM COATED ORAL DAILY
Start: 2025-04-07

## 2025-04-07 RX ORDER — SEMAGLUTIDE 1.7 MG/.75ML
1.7 INJECTION, SOLUTION SUBCUTANEOUS WEEKLY
Qty: 3 ML | Refills: 3 | Status: SHIPPED | OUTPATIENT
Start: 2025-04-07

## 2025-04-07 NOTE — PROGRESS NOTES
Name: Susan Love      : 1990      MRN: [id number]  Encounter Provider: Lea Reyes, MD  Encounter Date: 2025   Encounter department: [facility]    Assessment & Plan  Class 1 obesity due to excess calories without serious comorbidity with body mass index (BMI) of 32.0 to 32.9 in adult  Prior Authorization Clinical Questions for Weight Management Pharmacotherapy    1. Does the patient have a contrainidcation to medication prescribed for weight management?: No  2. Does the patient have a diagnosis of obesity, confirmed by a BMI greater than or equal to 30 kg/m^2?: No  3. Does the patient have a BMI of greater than or equal to 27 kg/m^2 with at least one weight-related comorbidity/risk factor/complication (e.g. diabetes, dyslipidemia, coronary artery disease)?: Yes  4. Weight-related co-morbidities/risk factors: dyslipidemia, depression  7. Has the patient been on a weight loss regimen of low-calorie diet, increased physical activity, and lifestyle modifications for a minimum of 6 months?: Yes  8. Has the patient completed a comprehensive weight loss program (ie, Weight Watchers, Noom, Bariatrics, other angelique on phone)? If so, what?: No  9. Does the patient have a history of type 2 diabetes?: No  10. Has the member tried and failed other weight loss medication within the past 12 months?: No  11. Will the member use requested medication in combination with another GLP agonist or weight loss drug?: No  12. Is the medication a controlled substance?: No  For renewals: Has the patient had a positive outcome with current weight management medication (i.e., change in body weight of at least 4-5% after 12-16 weeks on maximally tolerated dose)?: Yes     Baseline weight (in pounds): 216 lbs       Continue Wegovy 1.7 mg  Orders:  •  Semaglutide-Weight Management (Wegovy) 1.7 MG/0.75ML; Inject 0.75 mL (1.7 mg total) under the skin once a week    Attention deficit hyperactivity disorder (ADHD),  "predominantly inattentive type  Remains on brand Adderall XR due to migraines abdominal pain mood changes on generic Adderall XR         Chronic migraine without aura without status migrainosus, not intractable  Scheduled for Botox  MRV suggested Eagle's syndrome-\"Developmental relatively smaller right transverse and sigmoid dural venous sinus with loss of signal in the partially right internal jugular vein in the upper neck, likely related to slow flow, most likely due to mass effect from elongated   calcified/ossified stylohyoid ligament which can be seen in Eagle's syndrome.\"  Follow-up with neurology, possibly ENT  Sprain of left ankle, unspecified ligament, initial encounter  Rolled her left ankle 3/22  Swelling has resolved  Suspect sprain and will continue to observe       Gastroesophageal reflux disease without esophagitis    Orders:  •  famotidine (PEPCID) 20 mg tablet; Take 1 tablet (20 mg total) by mouth daily    Depression, unspecified depression type  Depression Screening Follow-up Plan: Patient's depression screening was positive with a PHQ-9 score of 5. Clinically patient does not have depression. No treatment is required.         Left-sided chest wall pain  Suspect this is muscular and we will continue to observe            History of Present Illness     For follow-up  Continues to have headaches/migraines since seeing neurology  MRA unremarkable except for changes related to prior aneurysm clipping   MRV suggests [place]'s syndrome   On Wegovy for weight management and tolerating it well  Reflux controlled on famotidine  Occasionally experiences left upper chest wall pain without palpitations or shortness of breath        Review of Systems   HENT:  Positive for tinnitus. Negative for hearing loss.    Cardiovascular:  Positive for chest pain.   Gastrointestinal:  Negative for abdominal pain, constipation and diarrhea.   Neurological:  Positive for headaches.     Past Medical History:   Diagnosis Date "   • Allergic    • Anxiety     Saw a therapist for 1 year at the time   • Depression    • FH: brain aneurysm    • Headache(784.0) 2006    History of headaches; mainly controlled at this point   • Herpes    • Hyperprolactinemia (HCC) 2015   • Hypertension  (last)    Blood pressure goes through periods where it is high for a few months then comes back down   • Migraine    • Obesity      Past Surgical History:   Procedure Laterality Date   • CEREBRAL ANEURYSM REPAIR      24yo   • CHOLECYSTECTOMY      20yo   • EYE SURGERY  ?    Clogged tear duct     Family History   Problem Relation Age of Onset   • Hypertension Mother    • Migraines Mother    • Diabetes Mother    • Cancer Mother         Endometrial   • Hypertension Father    • Arthritis Father    • Mental illness Maternal Grandmother         Anxiety, phobias, depression   • Depression Maternal Grandmother    • Thrombosis Maternal Grandmother    • Autoimmune disease Maternal Grandmother         Lupus (likely cause of interstitial lung disease)   • Anxiety disorder Maternal Grandmother    • Diabetes Maternal Grandfather    • Skin cancer Maternal Grandfather    • Hypertension Maternal Grandfather    • Stroke Maternal Grandfather    • Heart disease Maternal Grandfather    • Cancer Maternal Grandfather         Skin cancer   • Alcohol abuse Paternal Grandmother    • Stroke Paternal Grandmother         Cause of death   • Thrombosis Paternal Grandmother         Unsure, but she was on blood thinners   • Hypertension Paternal Grandfather      Social History     Tobacco Use   • Smoking status: Former     Current packs/day: 0.00     Types: Cigarettes     Quit date: 2016     Years since quittin.2   • Smokeless tobacco: Never   • Tobacco comments:     Smoked about 1 pack per week for about 1-1.5 yr   Vaping Use   • Vaping status: Never Used   Substance and Sexual Activity   • Alcohol use: Yes     Comment: Socially / not consistent   • Drug use: No   • Sexual  activity: Yes     Partners: Male     Birth control/protection: I.U.D.     Comment: Mirena 3/2015     Current Outpatient Medications on File Prior to Visit   Medication Sig   • ADDERALL XR, 15MG, 15 MG 24 hr capsule Take 1 capsule (15 mg total) by mouth every morning Max Daily Amount: 15 mg   • amphetamine-dextroamphetamine (ADDERALL XR, 15MG,) 15 MG 24 hr capsule Take 1 capsule (15 mg total) by mouth every morning Max Daily Amount: 15 mg   • amphetamine-dextroamphetamine (ADDERALL, 10MG,) 10 mg tablet Take 1 tablet (10 mg total) by mouth daily as needed (difficulty focusing) Max Daily Amount: 10 mg   • Botox 100 units INJECT UP  UNITS IN THE MUSCLE INTO VARIOUS SITES OF THE HEAD AND NECK ONCE EVERY 3 MONTHS   • cholecalciferol (VITAMIN D3) 1,000 units tablet Take 1,000 Units by mouth daily   • Doxepin HCl 6 MG TABS Take 1 tablet (6 mg total) by mouth daily at bedtime   • levonorgestrel (MIRENA) 20 MCG/24HR IUD by Intrauterine route   • ondansetron (ZOFRAN) 4 mg tablet Take 1 tablet (4 mg total) by mouth every 8 (eight) hours as needed for nausea or vomiting   • rimegepant sulfate (Nurtec) 75 mg TBDP Take 1 tablet (75 mg total) by mouth as needed (migraine) Limit of 1 in 24 hours   • sucralfate (CARAFATE) 1 g tablet TAKE 1 TABLET BY MOUTH 3 (THREE) TIMES A DAY AS NEEDED (HEADACHE-TAKE BEFORE INDOMETHACIN)   • valACYclovir (VALTREX) 1,000 mg tablet Take one tablet/day, as needed   • [DISCONTINUED] Semaglutide-Weight Management (Wegovy) 1.7 MG/0.75ML INJECT 1.7 MG UNDER THE SKIN WEEKLY   • indomethacin (INDOCIN) 50 mg capsule Take 45-60 minutes prior to workout (Patient not taking: Reported on 4/7/2025)     Allergies   Allergen Reactions   • Cobalt    • Edetic Acid    • Ethylenediamine    • Mercaptopurine      MERCAPTO MIX   • Thimerosal (Thiomersal)    • Nickel Itching and Rash     Immunization History   Administered Date(s) Administered   • COVID-19 Moderna mRNA Vaccine 12 Yr+ 50 mcg/0.5 mL (Spikevax)  "12/04/2023   • COVID-19 PFIZER VACCINE 0.3 ML IM 03/16/2021, 04/06/2021, 11/23/2021   • COVID-19 Pfizer Vac BIVALENT Real-sucrose 12 Yr+ IM 11/10/2022   • COVID-19 Pfizer mRNA vacc PF real-sucrose 12 yr and older (Comirnaty) 11/19/2024   • DTaP 01/22/1992, 08/24/1993, 01/13/1997   • DTaP 5 01/22/1992, 04/01/1992, 05/20/1992, 08/24/1993, 01/13/1997   • Hep B, Adolescent or Pediatric 03/01/1993, 04/05/1993   • Hep B, adult 03/01/1993, 04/05/1993, 10/11/1993   • HiB 04/01/1992   • Hib (PRP-OMP) 01/22/1992, 04/01/1992, 05/20/1992, 05/25/1993   • INFLUENZA 12/19/2015, 10/19/2016, 10/26/2017, 10/18/2018, 10/07/2019, 12/08/2020, 11/04/2022   • IPV 02/22/1992, 04/22/1992, 08/24/1993, 01/13/1997   • Influenza, seasonal, injectable, preservative free 11/18/2024   • MMR 03/01/1993, 10/21/1997   • Measles 05/20/1992   • Meningococcal MCV4P 06/21/2010   • Meningococcal, Unknown Serogroups 06/21/2010   • Td (adult), adsorbed 05/26/2006   • Tdap 12/02/2024   • Tuberculin Skin Test 06/21/2010   • Tuberculin Skin Test-PPD Intradermal 06/21/2010     Objective   /82 (BP Location: Left arm, Patient Position: Sitting, Cuff Size: Standard)   Pulse 92   Temp 98.7 °F (37.1 °C) (Tympanic)   Ht 5' 7\" (1.702 m)   Wt 84.7 kg (186 lb 12.8 oz)   SpO2 97%   BMI 29.26 kg/m²     Physical Exam  Constitutional:       General: She is not in acute distress.     Appearance: She is well-developed. She is not ill-appearing, toxic-appearing or diaphoretic.   HENT:      Right Ear: External ear normal. There is no impacted cerumen.      Left Ear: External ear normal. There is no impacted cerumen.   Eyes:      Conjunctiva/sclera: Conjunctivae normal.   Cardiovascular:      Rate and Rhythm: Normal rate and regular rhythm.      Heart sounds: Normal heart sounds. No murmur heard.  Pulmonary:      Effort: Pulmonary effort is normal. No respiratory distress.      Breath sounds: Normal breath sounds. No stridor. No wheezing or rales.   Chest:      Chest " wall: No tenderness.   Abdominal:      General: There is no distension.      Palpations: Abdomen is soft. There is no mass.      Tenderness: There is no abdominal tenderness. There is no guarding or rebound.   Musculoskeletal:      Cervical back: Neck supple.      Right lower leg: No edema.      Left lower leg: No edema.      Left ankle: No swelling. Tenderness present. Normal range of motion.   Neurological:      Mental Status: She is alert and oriented to person, place, and time.   Psychiatric:         Mood and Affect: Mood normal.         Behavior: Behavior normal.         Thought Content: Thought content normal.         Judgment: Judgment normal.

## 2025-04-07 NOTE — ASSESSMENT & PLAN NOTE
Remains on brand Adderall XR due to migraines abdominal pain mood changes on generic Adderall XR

## 2025-04-07 NOTE — ASSESSMENT & PLAN NOTE
"Scheduled for Botox  MRV suggested Eagle's syndrome-\"Developmental relatively smaller right transverse and sigmoid dural venous sinus with loss of signal in the partially right internal jugular vein in the upper neck, likely related to slow flow, most likely due to mass effect from elongated   calcified/ossified stylohyoid ligament which can be seen in Eagle's syndrome.\"  Follow-up with neurology, possibly ENT"

## 2025-04-07 NOTE — ASSESSMENT & PLAN NOTE
Prior Authorization Clinical Questions for Weight Management Pharmacotherapy    1. Does the patient have a contrainidcation to medication prescribed for weight management?: No  2. Does the patient have a diagnosis of obesity, confirmed by a BMI greater than or equal to 30 kg/m^2?: No  3. Does the patient have a BMI of greater than or equal to 27 kg/m^2 with at least one weight-related comorbidity/risk factor/complication (e.g. diabetes, dyslipidemia, coronary artery disease)?: Yes  4. Weight-related co-morbidities/risk factors: dyslipidemia, depression  7. Has the patient been on a weight loss regimen of low-calorie diet, increased physical activity, and lifestyle modifications for a minimum of 6 months?: Yes  8. Has the patient completed a comprehensive weight loss program (ie, Weight Watchers, Noom, Bariatrics, other angelique on phone)? If so, what?: No  9. Does the patient have a history of type 2 diabetes?: No  10. Has the member tried and failed other weight loss medication within the past 12 months?: No  11. Will the member use requested medication in combination with another GLP agonist or weight loss drug?: No  12. Is the medication a controlled substance?: No  For renewals: Has the patient had a positive outcome with current weight management medication (i.e., change in body weight of at least 4-5% after 12-16 weeks on maximally tolerated dose)?: Yes     Baseline weight (in pounds): 216 lbs       Continue Wegovy 1.7 mg  Orders:  •  Semaglutide-Weight Management (Wegovy) 1.7 MG/0.75ML; Inject 0.75 mL (1.7 mg total) under the skin once a week

## 2025-04-17 DIAGNOSIS — F90.0 ATTENTION DEFICIT HYPERACTIVITY DISORDER (ADHD), PREDOMINANTLY INATTENTIVE TYPE: ICD-10-CM

## 2025-04-18 RX ORDER — DEXTROAMPHETAMINE SACCHARATE, AMPHETAMINE ASPARTATE, DEXTROAMPHETAMINE SULFATE AND AMPHETAMINE SULFATE 2.5; 2.5; 2.5; 2.5 MG/1; MG/1; MG/1; MG/1
10 TABLET ORAL DAILY PRN
Qty: 15 TABLET | Refills: 0 | Status: SHIPPED | OUTPATIENT
Start: 2025-04-18

## 2025-04-18 RX ORDER — DEXTROAMPHETAMINE/AMPHETAMINE 15 MG
15 CAPSULE, EXT RELEASE 24 HR ORAL EVERY MORNING
Qty: 30 CAPSULE | Refills: 0 | Status: SHIPPED | OUTPATIENT
Start: 2025-04-18

## 2025-04-22 ENCOUNTER — TELEPHONE (OUTPATIENT)
Age: 35
End: 2025-04-22

## 2025-04-22 NOTE — TELEPHONE ENCOUNTER
Received call from Patient for New Patient - Skin Check - SOC on face. Scheduled 7/25/25 2:00 pm Amanda Bryan. Verified insurance, provided Carmi Irvin addr.     Patient verbalized understanding.

## 2025-04-23 DIAGNOSIS — G44.84 EXERTIONAL HEADACHE: ICD-10-CM

## 2025-04-23 DIAGNOSIS — I72.9 ANEURYSM (HCC): ICD-10-CM

## 2025-04-24 RX ORDER — SUCRALFATE 1 G/1
TABLET ORAL
Qty: 270 TABLET | Refills: 0 | Status: SHIPPED | OUTPATIENT
Start: 2025-04-24

## 2025-04-28 ENCOUNTER — TELEPHONE (OUTPATIENT)
Age: 35
End: 2025-04-28

## 2025-04-29 ENCOUNTER — TELEPHONE (OUTPATIENT)
Dept: INTERNAL MEDICINE CLINIC | Facility: CLINIC | Age: 35
End: 2025-04-29

## 2025-04-30 NOTE — TELEPHONE ENCOUNTER
Duplicate encounter created, please see telephone encounter from 4/28 regarding wegovy PA status. Please review patient's chart to see if there is already an encounter regarding the medication in question and to document anything regarding this medication in regards to anything regarding the authorization process etc before creating another encounter Thank You.

## 2025-04-30 NOTE — TELEPHONE ENCOUNTER
PA for wegovy 1.7 mg/0.75 ml APPROVED     Date(s) approved March 31, 2025 to April 30, 2026     Case #    Patient advised by          []MyChart Message  [x]Phone call   []LMOM  []L/M to call office as no active Communication consent on file  []Unable to leave detailed message as VM not approved on Communication consent       Pharmacy advised by    []Fax  []Phone call  []Secure Chat    Specialty Pharmacy    []     Approval letter scanned into Media No

## 2025-04-30 NOTE — TELEPHONE ENCOUNTER
PA for wegovy 1.7 mg/0.5 ml SUBMITTED to [facility]     via    []CMM-KEY:   [x]Surescripts-Case ID # [id number]   []Availity-Auth ID # NDC #   []Faxed to plan  []Other website   []Phone call Case ID #     []PA sent as URGENT    All office notes, labs and other pertaining documents and studies sent. Clinical questions answered. Awaiting determination from insurance company.     Turnaround time for your insurance to make a decision on your Prior Authorization can take 7-21 business days.

## 2025-05-07 DIAGNOSIS — S93.402A SPRAIN OF LEFT ANKLE, UNSPECIFIED LIGAMENT, INITIAL ENCOUNTER: Primary | ICD-10-CM

## 2025-05-08 ENCOUNTER — HOSPITAL ENCOUNTER (OUTPATIENT)
Dept: RADIOLOGY | Facility: HOSPITAL | Age: 35
Discharge: HOME | End: 2025-05-08
Attending: INTERNAL MEDICINE
Payer: COMMERCIAL

## 2025-05-08 ENCOUNTER — APPOINTMENT (OUTPATIENT)
Dept: RADIOLOGY | Facility: HOSPITAL | Age: 35
End: 2025-05-08
Payer: COMMERCIAL

## 2025-05-08 ENCOUNTER — APPOINTMENT (OUTPATIENT)
Dept: LAB | Facility: CLINIC | Age: 35
End: 2025-05-08
Payer: COMMERCIAL

## 2025-05-08 DIAGNOSIS — S93.402A SPRAIN OF LEFT ANKLE, UNSPECIFIED LIGAMENT, INITIAL ENCOUNTER: ICD-10-CM

## 2025-05-08 PROCEDURE — 73610 X-RAY EXAM OF ANKLE: CPT

## 2025-05-09 ENCOUNTER — RESULTS FOLLOW-UP (OUTPATIENT)
Dept: INTERNAL MEDICINE CLINIC | Facility: CLINIC | Age: 35
End: 2025-05-09

## 2025-05-09 DIAGNOSIS — S93.402A SPRAIN OF LEFT ANKLE, UNSPECIFIED LIGAMENT, INITIAL ENCOUNTER: Primary | ICD-10-CM

## 2025-05-09 DIAGNOSIS — F90.0 ATTENTION DEFICIT HYPERACTIVITY DISORDER (ADHD), PREDOMINANTLY INATTENTIVE TYPE: ICD-10-CM

## 2025-05-09 RX ORDER — DEXTROAMPHETAMINE/AMPHETAMINE 15 MG
15 CAPSULE, EXT RELEASE 24 HR ORAL EVERY MORNING
Qty: 30 CAPSULE | Refills: 0 | Status: SHIPPED | OUTPATIENT
Start: 2025-05-09

## 2025-05-09 RX ORDER — DEXTROAMPHETAMINE SACCHARATE, AMPHETAMINE ASPARTATE, DEXTROAMPHETAMINE SULFATE AND AMPHETAMINE SULFATE 2.5; 2.5; 2.5; 2.5 MG/1; MG/1; MG/1; MG/1
10 TABLET ORAL DAILY PRN
Qty: 15 TABLET | Refills: 0 | Status: SHIPPED | OUTPATIENT
Start: 2025-05-09

## 2025-05-19 ENCOUNTER — OFFICE VISIT (OUTPATIENT)
Dept: ENDOCRINOLOGY | Facility: CLINIC | Age: 35
End: 2025-05-19
Payer: COMMERCIAL

## 2025-05-19 VITALS
WEIGHT: 186 LBS | OXYGEN SATURATION: 100 % | HEIGHT: 67 IN | HEART RATE: 96 BPM | DIASTOLIC BLOOD PRESSURE: 80 MMHG | SYSTOLIC BLOOD PRESSURE: 120 MMHG | BODY MASS INDEX: 29.19 KG/M2

## 2025-05-19 DIAGNOSIS — E22.1 HYPERPROLACTINEMIA (HCC): Primary | ICD-10-CM

## 2025-05-19 DIAGNOSIS — E23.6 RATHKE'S POUCH CYST (HCC): ICD-10-CM

## 2025-05-19 PROCEDURE — 99204 OFFICE O/P NEW MOD 45 MIN: CPT | Performed by: INTERNAL MEDICINE

## 2025-05-19 NOTE — PROGRESS NOTES
Name: Susan Love      : 1990      MRN: [id number]  Encounter Provider: Maribel Tabares MD  Encounter Date: 2025   Encounter department: [facility] [place]    Chief Complaint   Patient presents with    Pituitary Problem   :  Assessment & Plan  Hyperprolactinemia (HCC)  Discussed with patient that Rathke's cleft cyst/pituitary microadenoma seen on imaging has been stable in size since  and likely not causing any pituitary hormone overproduction based on serial testing over the years.  Unable to assess whether patient has menstrual irregularities as she has been on oral contraceptives in the past and very now for the last 10 years, however patient does not have symptoms including galactorrhea and prolactin has only been slightly elevated since , likely not indicating presence of prolactinoma however we will check a macro prolactin.  Discussed that slight elevation of prolactin may be due to long-term medication including doxepin.  Will check a free T4 and update TSH which has not been checked recently   Will hold off on rechecking other pituitary labs which have recently been checked.    Orders:    Macroprolactin; Future    Rathke's pouch cyst (HCC)  As above           History of Present Illness     Susan Love is a 34 y.o. female with past medical history of L ICA aneurysm s/p clip , migraines, Rathke's cleft cyst dx , who presents for establishing care due to chronic mildly elevated prolactin levels as advised by her neurologist.  Patient had imaging done in [id number], results not available for migraine workup which revealed a rathke's cleft cyst vs cystic pituitary microadenoma measuring 8 cm in greatest dimension which has been stable in size since MRI imaging in  until recently, performed 2025.  She was advised by her neurologist to establish care with us since she has had mildly elevated prolactin levels, last level 40 recently  "checked in March 2025. Other hormones including IGF-1 wnl, had nl ACTH with 1 mg DST in 2024 with adequate suppression, TFTs wnl in 2023 however patient did have unintentional weight gain early 2024, now losing weight on Wegovy.  No head trauma, radiation or herbal supplements.  No galactorrhea, fertility issues; has a Mirena IUD and prior to that was on OCPs for birth control, no history of menstrual irregularities, no kids, no future plans for pregnancy.  No peripheral visual deficits reported.  Reasons are well-controlled with therapy prescribed by neurology.    Jan 2025: SELLA AND PITUITARY GLAND: No substantial change in 0.8 x 0.4 cm hypoenhancing focus in the right aspect of the pituitary gland on series 9 image 5. Infundibulum is midline. Optic chiasm is normal.     SELLA AND PITUITARY GLAND-  Cystic lesion in the anterior aspect of the   right side of the pituitary gland measuring 8 mm redemonstrated,   slightly displacing the infundibulum to the left.  No suprasellar or   parasellar extension.  Optic chiasm intact.       Pertinent Medical History   As above      Review of Systems as per [facility]       Medical History Reviewed by provider this encounter:     .    Objective   /80   Pulse 96   Ht 5' 7\" (1.702 m)   Wt 84.4 kg (186 lb)   SpO2 100%   BMI 29.13 kg/m²      Body mass index is 29.13 kg/m².  Wt Readings from Last 3 Encounters:   05/19/25 84.4 kg (186 lb)   04/07/25 84.7 kg (186 lb 12.8 oz)   01/30/25 86.8 kg (191 lb 6.4 oz)     Physical Exam  Constitutional:       General: She is not in acute distress.     Appearance: Normal appearance. She is not ill-appearing, toxic-appearing or diaphoretic.   HENT:      Head: Normocephalic and atraumatic.      Nose: Nose normal.     Eyes:      General:         Right eye: No discharge.         Left eye: No discharge.      Extraocular Movements: Extraocular movements intact.      Conjunctiva/sclera: Conjunctivae normal.      Comments: No lid lag   Neck:      " Comments: No thyromegaly or palpable nodules  Cardiovascular:      Rate and Rhythm: Normal rate.   Pulmonary:      Effort: Pulmonary effort is normal.     Musculoskeletal:         General: No swelling. Normal range of motion.      Cervical back: Neck supple. No tenderness.     Skin:     General: Skin is warm and dry.     Neurological:      Mental Status: She is alert and oriented to person, place, and time. Mental status is at baseline.      Comments: No tremors in hands   Psychiatric:         Mood and Affect: Mood normal.         Thought Content: Thought content normal.         Labs:   Component      Latest Ref Rng 6/13/2015 1/2/2016 2/10/2021 8/17/2023 9/5/2024   TESTOSTERONE TOTAL      8 - 48 ng/dL  15       TESTOSTERONE FREE      0.0 - 4.2 pg/mL  1.9       THYROGLOBULIN AB      0.0 - 0.9 IU/mL <1.0        THYROID MICROSOMAL ANTIBODY      0 - 34 IU/mL 6        PROLACTIN      3.34 - 26.72 ng/mL 27.0 (H)  67.0       PROLACTIN        68.2       TSH 3RD GENERATON      0.450 - 4.500 uIU/mL 0.671  1.400  1.480  1.684     FREE T4      0.61 - 1.12 ng/dL 0.9  0.77  0.83  0.62     HOMOCYST(E)INE, P/S      3.7 - 11.2 umol/L 11.4 (H)        FSH, POC      See Comment mIU/mL  2.3       LUTEINIZING HORMONE      See Comment mIU/mL  8.1       ESTRADIOL LEVEL      pg/mL  48.0       VITAMIN D, 25-HYDROXY      30.0 - 100.0 ng/mL  33.0       PTH      14.0 - 72.0 pg/mL  14.5       DHEA-SO4      110.0 - 431.7 ug/dL  186.6       INSULIN-LIKE GROWTH FACTOR-1      84 - 281 ng/mL  316.0       17-OH PROGESTERONE      ng/dL  71       ACTH      7.2 - 63.3 pg/mL  22.7       ANDROSTENEDIONE      41 - 262 ng/dL  158       Cortisol - AM      6.7 - 22.6 ug/dL     <0.4 (L)    ALPHA SUBUNIT (FREE)      ng/mL          Component      Latest Ref Rng 3/20/2025   TESTOSTERONE TOTAL      8 - 48 ng/dL    TESTOSTERONE FREE      0.0 - 4.2 pg/mL    THYROGLOBULIN AB      0.0 - 0.9 IU/mL    THYROID MICROSOMAL ANTIBODY      0 - 34 IU/mL    PROLACTIN      3.34 -  26.72 ng/mL 40.56 (H)    TSH 3RD GENERATON      0.450 - 4.500 uIU/mL    FREE T4      0.61 - 1.12 ng/dL    HOMOCYST(E)INE, P/S      3.7 - 11.2 umol/L    FSH, POC      See Comment mIU/mL 2.7    LUTEINIZING HORMONE      See Comment mIU/mL 8.0    ESTRADIOL LEVEL      pg/mL    VITAMIN D, 25-HYDROXY      30.0 - 100.0 ng/mL    PTH      14.0 - 72.0 pg/mL    DHEA-SO4      110.0 - 431.7 ug/dL    INSULIN-LIKE GROWTH FACTOR-1      84 - 281 ng/mL 264    17-OH PROGESTERONE      ng/dL    ACTH      7.2 - 63.3 pg/mL 28.0    ANDROSTENEDIONE      41 - 262 ng/dL    Cortisol - AM      6.7 - 22.6 ug/dL    ALPHA SUBUNIT (FREE)      ng/mL 0.25       eGFR   Date Value Ref Range Status   03/20/2025 97 ml/min/1.73sq m Final     Lab Results   Component Value Date    AVS4UQIKQBKH 1.684 08/17/2023    SKS6CGHGHXMR 1.480 02/10/2021    UBV3EMWCFFRQ 1.400 01/02/2016     Lab Results   Component Value Date    FREET4 0.62 08/17/2023   Study Result    Narrative & Impression   MRI BRAIN AND SELLA  WITH AND WITHOUT CONTRAST     INDICATION:  E23.6: Other disorders of pituitary gland  D35.2: Benign neoplasm of pituitary gland     COMPARISON: April 17, 2015     TECHNIQUE:  Multiplanar, multisequence imaging of the brain and sella was performed before and after gadolinium administration.     Targeted images of the sella were performed requiring additional time at acquisition and interpretation of approximately 25%     IV Contrast:  9 mL of Gadobutrol injection (SINGLE-DOSE)     IMAGE QUALITY:  Diagnostic.     FINDINGS:     BRAIN PARENCHYMA: No acute infarction, hemorrhage, mass effect, or herniation. Susceptibility artifact from left ICA aneurysm clipping. Chronic lacunar infarcts versus prominent perivascular spaces left basal ganglia and left cerebral peduncle.     VENTRICLES:  Normal for the patient's age.     SELLA AND PITUITARY GLAND: No substantial change in 0.8 x 0.4 cm hypoenhancing focus in the right aspect of the pituitary gland on series 9 image 5.  Infundibulum is midline. Optic chiasm is normal.     ORBITS:  Normal.     PARANASAL SINUSES:  Normal.     VASCULATURE:  Evaluation of the major intracranial vasculature demonstrates appropriate flow voids.     CALVARIUM AND SKULL BASE: Left vertex craniotomy defect. No destructive osseous lesions.     EXTRACRANIAL SOFT TISSUES:  Normal.     IMPRESSION:     1.  No substantial change in 0.8 x 0.4 cm hypoenhancing focus in the right aspect of the pituitary gland. Given stability since 2015, benign process such as Rathke's cleft cyst is favored. Cystic microadenoma felt less likely however consider correlation   with laboratory parameters.  2.  Otherwise stable appearance of the brain status post left ICA aneurysm clipping. No acute findings.     Workstation performed: [id number]        Imaging    MRI brain pituitary wo and w contrast (Order: [id number]) - 1/10/2025    Study Result    Narrative & Impression   THYROID ULTRASOUND     INDICATION:    E04.1: Nontoxic single thyroid nodule.    29-year-old female with sensation of lump in throat when swallowing.       COMPARISON:  None     TECHNIQUE:   Ultrasound of the thyroid was performed with a high frequency linear transducer in transverse and sagittal planes including volumetric imaging sweeps as well as traditional still imaging technique.     FINDINGS:   SIZE:  Right lobe:  5.6 x 1.1 x 1.8 cm. Volume:  5.3 ml  Left lobe:  5.4 x 1.4 x 1.5 cm. Volume: 5.5 ml  Isthmus:  0.3 cm.     PARENCHYMAL ECHOTEXTURE:   Normal homogeneous smooth echotexture.     NODULES:     2 x 3 x 3 mm solid nodule in the upper pole of the right lobe, for which follow-up sonography is not recommended by TI-RADS criteria.  No other nodules.        IMPRESSION:     1.  2 x 3 mm nodule in the upper pole of the right lobe.  No follow-up indicated.  2.  Otherwise normal thyroid sonogram.           Reference: ACR Thyroid Imaging, Reporting and Data System (TI-RADS): White Paper of the ACR TI-RADS Committee.  J AM Debbie Radiol 2017;14:587-595. (additional recommendations based on American Thyroid Association 2015 guidelines.)              Workstation performed: [id number]        Imaging    US thyroid (Order: [id number]) - 4/21/2020    There are no Patient Instructions on file for this visit.    Discussed with the patient and all questioned fully answered. She will call me if any problems arise.

## 2025-05-19 NOTE — ASSESSMENT & PLAN NOTE
Discussed with patient that Rathke's cleft cyst/pituitary microadenoma seen on imaging has been stable in size since 2015 and likely not causing any pituitary hormone overproduction based on serial testing over the years.  Unable to assess whether patient has menstrual irregularities as she has been on oral contraceptives in the past and very now for the last 10 years, however patient does not have symptoms including galactorrhea and prolactin has only been slightly elevated since 2015, likely not indicating presence of prolactinoma however we will check a macro prolactin.  Discussed that slight elevation of prolactin may be due to long-term medication including doxepin.  Will check a free T4 and update TSH which has not been checked recently   Will hold off on rechecking other pituitary labs which have recently been checked.    Orders:    Macroprolactin; Future

## 2025-05-29 ENCOUNTER — OFFICE VISIT (OUTPATIENT)
Dept: PODIATRY | Facility: CLINIC | Age: 35
End: 2025-05-29
Payer: COMMERCIAL

## 2025-05-29 VITALS — WEIGHT: 184.4 LBS | BODY MASS INDEX: 28.94 KG/M2 | HEIGHT: 67 IN

## 2025-05-29 DIAGNOSIS — M25.372 ANKLE INSTABILITY, LEFT: ICD-10-CM

## 2025-05-29 DIAGNOSIS — S93.492A SPRAIN OF ANTERIOR TALOFIBULAR LIGAMENT OF LEFT ANKLE, INITIAL ENCOUNTER: Primary | ICD-10-CM

## 2025-05-29 PROCEDURE — 99203 OFFICE O/P NEW LOW 30 MIN: CPT | Performed by: PODIATRIST

## 2025-05-29 NOTE — PROGRESS NOTES
"Name: Susan Love      : 1990      MRN: [id number]  Encounter Provider: David Boyer DPM  Encounter Date: 2025   Encounter department: [facility] [place]  :  Assessment & Plan  Sprain of anterior talofibular ligament of left ankle, initial encounter  Recommend physical therapy evaluation due to continued pain and discomfort to the area.    Ankle Sprain Treatment:    Rest, Ice, Compression, Elevate foot.  Anti-inflammatory medications (if you are able to take)  Home exercises    If no improvement:  Physical Therapy  Immobilization in boot  MRI    If conservative treatment fails and chronic instability persists:    Surgery can be considered    I personally discussed with patient at the visit today:     The diagnosis of this encounter  2.   Possible etiologies of the diagnosis  3.   Treatment options including advantages and disadvantages of treatment with potential risks and complications associated with these treatments  4.   Prevention strategies and ways to decrease pain     I answered all of the patients questions.    Orders:  •  Ambulatory referral to Physical Therapy; Future    Ankle instability, left    Orders:  •  Ambulatory referral to Physical Therapy; Future        History of Present Illness   HPI  Susan Love is a 34 y.o. female who presents Patient had injury and sprain to her ankle.  Had additional improvement after the spraining.  Patient states that she did not feel right with the ankle.  Patient had x-ray of the left ankle obtained and was referred to podiatry for further evaluation.  Ankle x-ray did not show any acute fractures or dislocations.  I reviewed these personally.      Review of Systems       Objective   Ht 5' 7\" (1.702 m) Comment: verbal  Wt 83.6 kg (184 lb 6.4 oz)   BMI 28.88 kg/m²      Physical Exam    Vascular: Intact pedal pulses bilateral DP and PT.  Neurological: Gross protective sensation intact bilateral  Musculoskeletal: Muscle strength " bilateral intact with dorsiflexion, inversion, eversion and plantarflexion.  Pain with palpation to the anterior talofibular ligament area. There is laxity noted on anterior drawer testing.  There is swelling noted anterior to the distal fibula.  No pain with tib/fib squeeze.  No pain with palpation proximal fibula.    Dermatological: No open lesions or ulcerations noted bilateral.

## 2025-06-05 ENCOUNTER — EVALUATION (OUTPATIENT)
Dept: PHYSICAL THERAPY | Facility: CLINIC | Age: 35
End: 2025-06-05
Attending: PODIATRIST
Payer: COMMERCIAL

## 2025-06-05 DIAGNOSIS — S93.492D SPRAIN OF ANTERIOR TALOFIBULAR LIGAMENT OF LEFT ANKLE, SUBSEQUENT ENCOUNTER: Primary | ICD-10-CM

## 2025-06-05 PROCEDURE — 97110 THERAPEUTIC EXERCISES: CPT | Performed by: PHYSICAL THERAPIST

## 2025-06-05 PROCEDURE — 97161 PT EVAL LOW COMPLEX 20 MIN: CPT | Performed by: PHYSICAL THERAPIST

## 2025-06-05 NOTE — PROGRESS NOTES
PT Evaluation     Today's date: 2025  Patient name: Susan Love  : 1990  MRN: [id number]  Referring provider: David Boyer,*  Dx:   Encounter Diagnosis     ICD-10-CM    1. Sprain of anterior talofibular ligament of left ankle, subsequent encounter  S93.492D                      Assessment  Impairments: abnormal gait, abnormal or restricted ROM, abnormal movement, activity intolerance, impaired physical strength, lacks appropriate home exercise program, pain with function, weight-bearing intolerance, poor body mechanics, participation limitations and activity limitations    Assessment details: Pt presents with s/s consistent with a healing L ATFL sprain. She will benefit from skilled PT services to address her impairments in order to decrease pain and restore function.  Understanding of Dx/Px/POC: good     Prognosis: good    Goals  STG - 2-4 wks  1) pt will normalize L ankle ROM  2) pt will improve pain 50%    LTG - 4-6 wks  1) pt will normalize gait  2) pt will resolve pain    Plan  Patient would benefit from: skilled physical therapy  Planned modality interventions: low level laser therapy    Planned therapy interventions: joint mobilization, manual therapy, balance, neuromuscular re-education, postural training, self care, strengthening, stretching, therapeutic activities, therapeutic exercise, home exercise program, graded exercise, graded activity, gait training, functional ROM exercises, flexibility and body mechanics training    Frequency: 1x week  Duration in weeks: 6  Treatment plan discussed with: patient        Subjective Evaluation    History of Present Illness  Mechanism of injury: Pt is a 34 y.o. female with PMHx significant for obesity, ADHD, depression, migraines, carotid aneurysm, pituitary adenoma. She reports sudden onset of lateral L ankle pain 3/22/25 after rolling her ankle. She reports 70% improvement in pain with RICE x 4 wks then improvement plateaued.  Patient  Goals  Patient goals for therapy: decreased pain, increased motion and independence with ADLs/IADLs    Pain  Current pain ratin  At best pain ratin  At worst pain ratin  Quality: pulling and sharp  Relieving factors: rest (sitting)  Exacerbated by: prolonged WB > 3 hrs.  Progression: improved    Social Support    Employment status: working (web developement)    Diagnostic Tests  X-ray: normal  Treatments  No previous or current treatments        Objective     Observations   Left Ankle/Foot   Negative for edema.     Tenderness   Left Ankle/Foot   Tenderness in the anterior talofibular ligament. No tenderness in the anterior ankle.     Passive Range of Motion   Left Ankle/Foot    Dorsiflexion (ke): 4 degrees   Dorsiflexion (kf): 8 degrees   Plantar flexion: 68 degrees   Inversion: 40 degrees   Eversion: 14 degrees     Right Ankle/Foot    Dorsiflexion (ke): 8 degrees   Dorsiflexion (kf): 15 degrees    Plantar flexion: 70 degrees   Inversion: 28 degrees   Eversion: 16 degrees     Strength/Myotome Testing     Left Ankle/Foot   Dorsiflexion: 4+  Plantar flexion: 4  Inversion: 4+  Eversion: 4+    Right Ankle/Foot   Dorsiflexion: 4+  Plantar flexion: 4  Inversion: 4+  Eversion: 4+    Tests   Left Ankle/Foot   Positive for inversion talar tilt.   Negative for anterior drawer.     Additional Tests Details  Repeated Motions  Rep DF NWB pt OP NE,NE  Rep DF pt OP PWB D,B (improved pain with INV, DF PROM)    General Comments:      Ankle/Foot Comments   Gait mildly IR'ed L foot    DL squat unremarkable    SLS EO B 60 sec, EC R 5 sec, L 15 sec             Precautions: PMHx: obesity, ADHD, depression, migraines, carotid aneurysm, pituitary adenoma  Dx:     Manuals 6/5            DF MWM NWB 3x10            Laser L ATFL                                       Neuro Re-Ed             SLS EC  floor           SLS EO foam             SLS EO rebounder                                                                 Ther Ex              Rep L ankle DF NWB 1x10            Rep L ankle DF pt OP NWB 2x10            Rep L ankle DF PWB 3x10                                                                             Ther Activity                                       Gait Training             TM: normalize L foot ER                          Modalities

## 2025-06-06 DIAGNOSIS — F33.0 MILD EPISODE OF RECURRENT MAJOR DEPRESSIVE DISORDER (HCC): ICD-10-CM

## 2025-06-09 DIAGNOSIS — F33.0 MILD EPISODE OF RECURRENT MAJOR DEPRESSIVE DISORDER (HCC): ICD-10-CM

## 2025-06-09 DIAGNOSIS — F90.0 ATTENTION DEFICIT HYPERACTIVITY DISORDER (ADHD), PREDOMINANTLY INATTENTIVE TYPE: ICD-10-CM

## 2025-06-09 RX ORDER — DEXTROAMPHETAMINE SACCHARATE, AMPHETAMINE ASPARTATE, DEXTROAMPHETAMINE SULFATE AND AMPHETAMINE SULFATE 2.5; 2.5; 2.5; 2.5 MG/1; MG/1; MG/1; MG/1
10 TABLET ORAL DAILY PRN
Qty: 15 TABLET | Refills: 0 | Status: SHIPPED | OUTPATIENT
Start: 2025-06-09

## 2025-06-09 RX ORDER — DOXEPIN 6 MG/1
6 TABLET, FILM COATED ORAL
Qty: 30 TABLET | Refills: 2 | Status: SHIPPED | OUTPATIENT
Start: 2025-06-09

## 2025-06-09 RX ORDER — DOXEPIN 6 MG/1
6 TABLET, FILM COATED ORAL
Qty: 30 TABLET | Refills: 2 | OUTPATIENT
Start: 2025-06-09

## 2025-06-11 ENCOUNTER — OFFICE VISIT (OUTPATIENT)
Dept: PHYSICAL THERAPY | Facility: CLINIC | Age: 35
End: 2025-06-11
Attending: PODIATRIST
Payer: COMMERCIAL

## 2025-06-11 DIAGNOSIS — S93.492D SPRAIN OF ANTERIOR TALOFIBULAR LIGAMENT OF LEFT ANKLE, SUBSEQUENT ENCOUNTER: Primary | ICD-10-CM

## 2025-06-11 PROCEDURE — 97116 GAIT TRAINING THERAPY: CPT | Performed by: PHYSICAL THERAPIST

## 2025-06-11 PROCEDURE — 97110 THERAPEUTIC EXERCISES: CPT | Performed by: PHYSICAL THERAPIST

## 2025-06-11 PROCEDURE — 97112 NEUROMUSCULAR REEDUCATION: CPT | Performed by: PHYSICAL THERAPIST

## 2025-06-11 NOTE — PROGRESS NOTES
"Daily Note     Today's date: 2025  Patient name: Susan Love  : 1990  MRN: [id number]  Referring provider: David Boyer,*  Dx:   Encounter Diagnosis     ICD-10-CM    1. Sprain of anterior talofibular ligament of left ankle, subsequent encounter  S93.492D                      Subjective: Pt reports good compliance with HEP, an overall improvement with less pain at rest.    Objective: See treatment diary below    Assessment: Pt demonstrated min pain at ER PF and INV, improved DF ROM.    Plan: Continue per plan of care.      Precautions: PMHx: obesity, ADHD, depression, migraines, carotid aneurysm, pituitary adenoma    Dx: L ATFL sprain with underlying ankle derangement responding to PWB DF.      Manuals            DF MWM NWB 3x10 3x10           Laser L ATFL  4000J                                     Neuro Re-Ed             SLS EC  Floor/ 2x30\"           SLS EO foam  2x30\"           SLS EO rebounder  2x30                                                               Ther Ex             Rep L ankle DF NWB 1x10 1x10           Rep L ankle DF pt OP NWB 2x10 3x10           Rep L ankle DF PWB 3x10 3x10                                                                            Ther Activity                                       Gait Training             TM: normalize L foot ER  10 min 2.0 mph                        Modalities                                            " Subjective   Geeta Negrete is a 33 y.o. female is here today for follow-up.    Chief Complaint   Patient presents with   • Follow-up     had an U/S last Tuesday and started bleeding heavily on Friday         History of Present Illness  Patient underwent an ultrasound last week for new OB exam.  The sound showed an intrauterine demise at about 7 weeks.  Patient began bleeding heavily this weekend and believes she may have miscarried.  She returns today for follow-up ultrasound.  Patient's plan is to wait until she miscarried spontaneously.  She is O+  The following portions of the patient's history were reviewed and updated as appropriate: allergies, current medications, past family history, past medical history, past social history, past surgical history and problem list.    Review of Systems - Negative except present illness    Objective   General:  well developed; well nourished  no acute distress   Skin:  Not performed.   Thyroid: not examined   Breasts:  Not performed.   Abdomen: Not performed.   Heart: Not performed   Lungs: Not performed   Pelvis: Not performed.         Assessment/Plan   Geeta was seen today for follow-up.    Diagnoses and all orders for this visit:    Missed   -     US Non-ob Transvaginal    Vaginal bleeding in pregnancy, first trimester  -     US Non-ob Transvaginal    Complete       Ultrasound shows a empty uterus consistent with a complete   Patient will return as needed    Doug Zhao MD

## 2025-06-18 ENCOUNTER — OFFICE VISIT (OUTPATIENT)
Dept: PHYSICAL THERAPY | Facility: CLINIC | Age: 35
End: 2025-06-18
Attending: PODIATRIST
Payer: COMMERCIAL

## 2025-06-18 DIAGNOSIS — S93.492D SPRAIN OF ANTERIOR TALOFIBULAR LIGAMENT OF LEFT ANKLE, SUBSEQUENT ENCOUNTER: Primary | ICD-10-CM

## 2025-06-18 DIAGNOSIS — F90.0 ATTENTION DEFICIT HYPERACTIVITY DISORDER (ADHD), PREDOMINANTLY INATTENTIVE TYPE: ICD-10-CM

## 2025-06-18 PROCEDURE — 97110 THERAPEUTIC EXERCISES: CPT | Performed by: PHYSICAL THERAPIST

## 2025-06-18 PROCEDURE — 97140 MANUAL THERAPY 1/> REGIONS: CPT | Performed by: PHYSICAL THERAPIST

## 2025-06-18 PROCEDURE — 97112 NEUROMUSCULAR REEDUCATION: CPT | Performed by: PHYSICAL THERAPIST

## 2025-06-18 PROCEDURE — 97116 GAIT TRAINING THERAPY: CPT | Performed by: PHYSICAL THERAPIST

## 2025-06-18 RX ORDER — DEXTROAMPHETAMINE/AMPHETAMINE 15 MG
15 CAPSULE, EXT RELEASE 24 HR ORAL EVERY MORNING
Qty: 30 CAPSULE | Refills: 0 | Status: SHIPPED | OUTPATIENT
Start: 2025-06-18 | End: 2025-06-24 | Stop reason: SDUPTHER

## 2025-06-18 NOTE — PROGRESS NOTES
"Daily Note     Today's date: 2025  Patient name: Susan Love  : 1990  MRN: [id number]  Referring provider: David Boyer,*  Dx:   Encounter Diagnosis     ICD-10-CM    1. Sprain of anterior talofibular ligament of left ankle, subsequent encounter  S93.492D                      Subjective: Pt reports improved pain 1st week when she was more compliant with her HEP but notes less relief this past week but notes poor HEP compliance while on vacation.    Objective: See treatment diary below    Assessment: Pt demonstrated improved pain-free L ankle PROM following repeated motions.    Plan: Continue per plan of care.      Precautions: PMHx: ADHD, depression, migraines, carotid aneurysm, pituitary adenoma    Dx: L ATFL sprain with underlying ankle derangement responding to PWB DF.      Manuals           DF MWM NWB 3x10 3x10 PWB  3x10          Laser L ATFL  4000J           Graston L ATFL   4 min                       Neuro Re-Ed             SLS EC  Floor/ 2x30\" Floor  30\"x3          SLS EO foam  2x30\" 30\"x3          SLS EO rebounder  2x30 2x30                                                              Ther Ex             Rep L ankle DF NWB 1x10 1x10           Rep L ankle DF pt OP NWB 2x10 3x10           Rep L ankle DF PWB 3x10 3x10 3x10          Standing GA stretch   10\"x3                                                              Ther Activity                                       Gait Training             TM: normalize L foot ER  10 min 2.0 mph                        Modalities                                            "

## 2025-06-24 ENCOUNTER — PROCEDURE VISIT (OUTPATIENT)
Dept: NEUROLOGY | Facility: CLINIC | Age: 35
End: 2025-06-24
Payer: COMMERCIAL

## 2025-06-24 VITALS — DIASTOLIC BLOOD PRESSURE: 96 MMHG | SYSTOLIC BLOOD PRESSURE: 142 MMHG | HEART RATE: 89 BPM | TEMPERATURE: 98.6 F

## 2025-06-24 DIAGNOSIS — G43.709 CHRONIC MIGRAINE WITHOUT AURA WITHOUT STATUS MIGRAINOSUS, NOT INTRACTABLE: Primary | ICD-10-CM

## 2025-06-24 DIAGNOSIS — M24.20 EAGLE'S SYNDROME: Primary | ICD-10-CM

## 2025-06-24 DIAGNOSIS — F90.0 ATTENTION DEFICIT HYPERACTIVITY DISORDER (ADHD), PREDOMINANTLY INATTENTIVE TYPE: ICD-10-CM

## 2025-06-24 PROCEDURE — 64615 CHEMODENERV MUSC MIGRAINE: CPT | Performed by: PHYSICIAN ASSISTANT

## 2025-06-25 ENCOUNTER — OFFICE VISIT (OUTPATIENT)
Dept: PHYSICAL THERAPY | Facility: CLINIC | Age: 35
End: 2025-06-25
Attending: PODIATRIST
Payer: COMMERCIAL

## 2025-06-25 DIAGNOSIS — S93.492D SPRAIN OF ANTERIOR TALOFIBULAR LIGAMENT OF LEFT ANKLE, SUBSEQUENT ENCOUNTER: Primary | ICD-10-CM

## 2025-06-25 PROCEDURE — 97140 MANUAL THERAPY 1/> REGIONS: CPT | Performed by: PHYSICAL THERAPIST

## 2025-06-25 PROCEDURE — 97116 GAIT TRAINING THERAPY: CPT | Performed by: PHYSICAL THERAPIST

## 2025-06-25 PROCEDURE — 97112 NEUROMUSCULAR REEDUCATION: CPT | Performed by: PHYSICAL THERAPIST

## 2025-06-25 PROCEDURE — 97110 THERAPEUTIC EXERCISES: CPT | Performed by: PHYSICAL THERAPIST

## 2025-06-25 RX ORDER — DEXTROAMPHETAMINE/AMPHETAMINE 15 MG
15 CAPSULE, EXT RELEASE 24 HR ORAL EVERY MORNING
Qty: 30 CAPSULE | Refills: 0 | Status: SHIPPED | OUTPATIENT
Start: 2025-06-25

## 2025-06-25 NOTE — PROGRESS NOTES
"Daily Note     Today's date: 2025  Patient name: Susan Love  : 1990  MRN: [id number]  Referring provider: David Boyer,*  Dx:   Encounter Diagnosis     ICD-10-CM    1. Sprain of anterior talofibular ligament of left ankle, subsequent encounter  S93.492D                      Subjective: Pt reports intermittent L ankle pain which tends be better when she performs her HEP more often.    Objective: See treatment diary below    Assessment: Pt demonstrates normalized L ankle ROM in all planes except DF and significantly improved pain with end ROM INV.    Plan: Continue per plan of care.      Precautions: PMHx: ADHD, depression, migraines, carotid aneurysm, pituitary adenoma    Dx: L ATFL sprain with underlying ankle derangement responding to PWB DF.      Manuals          DF MWM NWB 3x10 3x10 PWB  3x10 PWB  3x10         Laser L ATFL  4000J           Graston L ATFL   4 min 4 min                      Neuro Re-Ed             SLS EC  Floor/ 2x30\" Floor  30\"x3 L=S  45\"x2         SLS EO foam  2x30\" 30\"x3 L7  60\"x2         SLS EO rebounder  2x30 2x30 Floor  1x30    Foam  1x30 Foam  2x35                                                            Ther Ex             Rep L ankle DF NWB 1x10 1x10           Rep L ankle DF pt OP NWB 2x10 3x10           Rep L ankle DF PWB 3x10 3x10 3x10 3x10         Standing GA stretch   10\"x3 15\"x3                                                             Ther Activity             KB squats    10#  1x10    20#  1x10    30#  1x10                      Gait Training             TM: normalize L foot ER  10 min 2.0 mph                        Modalities                                            "

## 2025-07-02 ENCOUNTER — OFFICE VISIT (OUTPATIENT)
Dept: PHYSICAL THERAPY | Facility: CLINIC | Age: 35
End: 2025-07-02
Attending: PODIATRIST
Payer: COMMERCIAL

## 2025-07-02 DIAGNOSIS — S93.492D SPRAIN OF ANTERIOR TALOFIBULAR LIGAMENT OF LEFT ANKLE, SUBSEQUENT ENCOUNTER: Primary | ICD-10-CM

## 2025-07-02 PROCEDURE — 97112 NEUROMUSCULAR REEDUCATION: CPT | Performed by: PHYSICAL THERAPIST

## 2025-07-02 PROCEDURE — 97140 MANUAL THERAPY 1/> REGIONS: CPT | Performed by: PHYSICAL THERAPIST

## 2025-07-02 PROCEDURE — 97110 THERAPEUTIC EXERCISES: CPT | Performed by: PHYSICAL THERAPIST

## 2025-07-02 NOTE — PROGRESS NOTES
"Daily Note     Today's date: 2025  Patient name: Susan Love  : 1990  MRN: [id number]  Referring provider: David Boyer,*  Dx:   Encounter Diagnosis     ICD-10-CM    1. Sprain of anterior talofibular ligament of left ankle, subsequent encounter  S93.492D                      Subjective: Pt reports dorsal L foot pain after bike riding then walking for 1 hr yesterday. She reports P with standing GA stretch earlier today.    Objective: See treatment diary below    Assessment: Pt able to abolish with PWB DF.    Plan: Cont POC.     Precautions: PMHx: ADHD, depression, migraines, carotid aneurysm, pituitary adenoma    Dx: L ATFL sprain with underlying ankle derangement responding to PWB DF.      Manuals         DF MWM NWB 3x10 3x10 PWB  3x10 PWB  3x10 PWB  3x10        Laser L ATFL  4000J           Graston L ATFL   4 min 4 min 4 min                     Neuro Re-Ed             SLS EC  Floor/ 2x30\" Floor  30\"x3 L=S  45\"x2 L=S  60\"x2        SLS EO foam  2x30\" 30\"x3 L7  60\"x2 L7  60\"x2        SLS EO rebounder  2x30 2x30 Floor  1x30    Foam  1x30 Foam  2x30                                                            Ther Ex             Rep L ankle DF NWB 1x10 1x10           Rep L ankle DF pt OP NWB 2x10 3x10           Rep L ankle DF PWB 3x10 3x10 3x10 3x10 4x10        Standing GA stretch   10\"x3 15\"x3 15\"x3                                                            Ther Activity             KB squats    10#  1x10    20#  1x10    30#  1x10 np                     Gait Training             TM: normalize L foot ER  10 min 2.0 mph                        Modalities                                            "

## 2025-07-09 ENCOUNTER — APPOINTMENT (OUTPATIENT)
Dept: PHYSICAL THERAPY | Facility: CLINIC | Age: 35
End: 2025-07-09
Attending: PODIATRIST
Payer: COMMERCIAL

## 2025-07-10 DIAGNOSIS — F90.0 ATTENTION DEFICIT HYPERACTIVITY DISORDER (ADHD), PREDOMINANTLY INATTENTIVE TYPE: ICD-10-CM

## 2025-07-10 RX ORDER — DEXTROAMPHETAMINE SACCHARATE, AMPHETAMINE ASPARTATE, DEXTROAMPHETAMINE SULFATE AND AMPHETAMINE SULFATE 2.5; 2.5; 2.5; 2.5 MG/1; MG/1; MG/1; MG/1
10 TABLET ORAL DAILY PRN
Qty: 15 TABLET | Refills: 0 | Status: SHIPPED | OUTPATIENT
Start: 2025-07-10

## 2025-07-21 DIAGNOSIS — F90.0 ATTENTION DEFICIT HYPERACTIVITY DISORDER (ADHD), PREDOMINANTLY INATTENTIVE TYPE: ICD-10-CM

## 2025-07-21 RX ORDER — DEXTROAMPHETAMINE/AMPHETAMINE 15 MG
15 CAPSULE, EXT RELEASE 24 HR ORAL EVERY MORNING
Qty: 30 CAPSULE | Refills: 0 | Status: SHIPPED | OUTPATIENT
Start: 2025-07-21

## 2025-07-25 ENCOUNTER — OFFICE VISIT (OUTPATIENT)
Dept: DERMATOLOGY | Facility: CLINIC | Age: 35
End: 2025-07-25
Payer: COMMERCIAL

## 2025-07-25 VITALS — BODY MASS INDEX: 28.56 KG/M2 | TEMPERATURE: 98.5 F | HEIGHT: 67 IN | WEIGHT: 182 LBS

## 2025-07-25 DIAGNOSIS — D22.71 MULTIPLE BENIGN MELANOCYTIC NEVI OF BOTH UPPER EXTREMITIES, BOTH LOWER EXTREMITIES, AND TRUNK: ICD-10-CM

## 2025-07-25 DIAGNOSIS — D18.01 CHERRY ANGIOMA: Primary | ICD-10-CM

## 2025-07-25 DIAGNOSIS — D22.72 MULTIPLE BENIGN MELANOCYTIC NEVI OF BOTH UPPER EXTREMITIES, BOTH LOWER EXTREMITIES, AND TRUNK: ICD-10-CM

## 2025-07-25 DIAGNOSIS — D22.5 MULTIPLE BENIGN MELANOCYTIC NEVI OF BOTH UPPER EXTREMITIES, BOTH LOWER EXTREMITIES, AND TRUNK: ICD-10-CM

## 2025-07-25 DIAGNOSIS — D22.62 MULTIPLE BENIGN MELANOCYTIC NEVI OF BOTH UPPER EXTREMITIES, BOTH LOWER EXTREMITIES, AND TRUNK: ICD-10-CM

## 2025-07-25 DIAGNOSIS — Z12.83 SKIN CANCER SCREENING: ICD-10-CM

## 2025-07-25 DIAGNOSIS — L81.4 LENTIGO: ICD-10-CM

## 2025-07-25 DIAGNOSIS — L30.8 OTHER ECZEMA: ICD-10-CM

## 2025-07-25 DIAGNOSIS — D22.61 MULTIPLE BENIGN MELANOCYTIC NEVI OF BOTH UPPER EXTREMITIES, BOTH LOWER EXTREMITIES, AND TRUNK: ICD-10-CM

## 2025-07-25 DIAGNOSIS — L73.8 SEBACEOUS HYPERPLASIA: ICD-10-CM

## 2025-07-25 PROCEDURE — 99204 OFFICE O/P NEW MOD 45 MIN: CPT

## 2025-07-25 RX ORDER — CLOBETASOL PROPIONATE 0.5 MG/G
CREAM TOPICAL 2 TIMES DAILY
Qty: 60 G | Status: CANCELLED | OUTPATIENT
Start: 2025-07-25

## 2025-07-25 RX ORDER — TRIAMCINOLONE ACETONIDE 1 MG/G
OINTMENT TOPICAL 2 TIMES DAILY
Qty: 80 G | Refills: 1 | Status: SHIPPED | OUTPATIENT
Start: 2025-07-25

## 2025-07-25 NOTE — PROGRESS NOTES
"[facility] Note     Patient Name: Susan Love  Encounter Date: 07/25/2025       Have you been cared for by a [facility] Dermatologist in the last 3 years and, if so, which description applies to you? NO. I am considered a \"new\" patient and must complete all patient intake questions. I am of child-bearing potential.     REVIEW OF SYSTEMS:  Have you recently had or currently have any of the following? Recent fever or chills? No  Any non-healing wound? No  Are you pregnant or planning to become pregnant? No  Are you currently or planning to be nursing or breast feeding? No   PAST MEDICAL HISTORY:  Have you personally ever had or currently have any of the following?  If \"YES,\" then please provide more detail. Skin cancer (such as Melanoma, Basal Cell Carcinoma, Squamous Cell Carcinoma?  No  Tuberculosis, HIV/AIDS, Hepatitis B or C: No  Radiation Treatment No   HISTORY OF IMMUNOSUPPRESSION:   Do you have a history of any of the following:  Systemic Immunosuppression such as Diabetes, Biologic or Immunotherapy, Chemotherapy, Organ Transplantation, Bone Marrow Transplantation or Prednsione?  No    Answering \"YES\" requires the addition of the dotphrase \"IMMUNOSUPPRESSED\" as the first diagnosis of the patient's visit.   FAMILY HISTORY:  Any \"first degree relatives\" (parent, brother, sister, or child) with the following?    Skin Cancer, Pancreatic or Other Cancer? YES, Grandfather BCC    PATIENT EXPERIENCE:    Do you want the Dermatologist to perform a COMPLETE skin exam today including a clinical examination under the \"bra and underwear\" areas?  Yes  If necessary, do we have your permission to call and leave a detailed message on your Preferred Phone number that includes your specific medical information?  Yes      Allergies[1] Current Medications[1]              Whom besides the patient is providing clinical information about today's encounter?   NO ADDITIONAL HISTORIAN (patient alone provided " "history)    Physical Exam and Assessment/Plan by Diagnosis:      CHERRY ANGIOMAS     Physical Exam:  Anatomic Location Affected:  Trunk and extremities  Morphological Description:  Scattered cherry red papules  Denies pain, itch, bleeding. No treatments tried. Present for years. Present constantly; no modifying factors which make it worse or better.     Assessment and Plan:  Based on a thorough discussion of this condition and the management approach to it (including a comprehensive discussion of the known risks, side effects and potential benefits of treatment), the patient (family) agrees to implement the following specific plan:  Reassure benign       SOLAR LENTIGINES   OTHER SKIN CHANGES DUE TO CHRONIC EXPOSURE TO NONIONIZING RADIATION     Physical Exam:  Anatomic Location Affected:  Sun exposed areas of back, chest, arms, legs  Morphological Description:  Multiple scattered brown to tan evenly pigmented macules   Denies pain, itch, bleeding. No treatments tried. Present for months - years. Reports getting newer lesions with sun exposure.         Assessment and Plan:  Based on a thorough discussion of this condition and the management approach to it (including a comprehensive discussion of the known risks, side effects and potential benefits of treatment), the patient (family) agrees to implement the following specific plan:  Reassure benign  Use sun protection.  Apply SPF 30 or higher at least three times a day.  Wear sun protecting clothing and hats.         MULTIPLE MELANOCYTIC NEVI (\"Moles\")     Physical Exam:  Anatomic Location Affected: Trunk and extremities  Morphological Description:  Scattered, round to ovoid, symmetrical-appearing, even bordered, skin colored to dark brown macules/papules  Denies pain, itch, bleeding. No treatments tried. Present for years. Present constantly; no modifying factors which make it worse or better. Denies actively changing or growing moles.      Assessment and Plan:  Based " on a thorough discussion of this condition and the management approach to it (including a comprehensive discussion of the known risks, side effects and potential benefits of treatment), the patient (family) agrees to implement the following specific plan:  Reassure benign  Monitor for changes  Use sun protection.  Apply SPF 30 or higher at least three times a day.  Wear sun protecting clothing and hats.       Worrisome signs of skin malignancy discussed, questions answered. Regular self-skin check discussed. Advised to call or return to office if patient notices any spots of concern, rapidly growing/changing lesions, bleeding lesions, non-healing lesions. Advised regular SPF use.      SEBACEOUS HYPERPLASIA    Physical Exam:  Anatomic Location Affected:  forehead, cheeks  Morphological Description:  yellow pink papules   Pertinent Positives:  Pertinent Negatives:    Additional History of Present Condition:  The patient reports that it has been there for some time. They deny any associated bleeding, itching, or pain.    Assessment and Plan:  Based on a thorough discussion of this condition and the management approach to it (including a comprehensive discussion of the known risks, side effects and potential benefits of treatment), the patient (family) agrees to implement the following specific plan:  Discussed option for cosmetic removal  Reassured benign  .  Sebaceous Hyperplasia  Sebaceous hyperplasia is a common, benign condition of enlarged oil secreting (sebaceous) glands commonly found on the forehead and cheeks of middle-aged and elderly patients. They normally appear as small yellow bumps up to 3mm in diameter that can be single or multiple. The bumps on the face often display a centrall dell. Occasionally, these bumps can occur on the chest, areola, mouth, and genitals. Rarely, they can grow to take a giant form, or be arranged linearly.     Causes of sebaceous hyperplasia  Sebaceous hyperplasic is a form of  "benign hair follicle tumor and can often be confused with basal cell carcinoma. It can be more prevalent in immunosuppressed patients such as those undergoing organ transplantation. In the rare Quoc-Thomas syndrome, sebaceous hyperplasia occurs in association with internal cancers.  Lesions of sebaceous hyperplasia are benign, with no known potential for malignant transformation, but they may be associated with nonmelanoma skin cancer in transplantation patients.     How we do diagnose sebaceous hyperplasia?  Your dermatologist may take a closer look at the bumps with a device called a dermatoscope. Common features include a central hair follicle surrounded by yellowish lobules with prominent blood vessels.     What is the treatment of sebaceous hyperplasia?   Since sebaceous hyperplasia is benign with no known potential for transformation into cancer, treatment is mostly for cosmetic reasons or if the lesions become irritated. Options include   Light electrocautery or laser vaporization  Oral isotrentinoin is effective for extensive or disfiguring spots, but do not prevent recurrence   Antiandrogens may be used in females to decrease the size and improve overall appearance of bumps     ATOPIC DERMATITIS (\"Eczema\")    Physical Exam:  Anatomic Location Affected:  lower back/hips  Morphological Description:  small papular erythematous patch approx 1cm over L lower back   Body Surface Area Today:  <1%  Overall Severity: mild  Pertinent Positives:  Pertinent Negatives:    Additional History of Present Condition:  Present on exam. Pt reports hx of eczema and has triamcinolone cream    Assessment and Plan:  Based on a thorough discussion of this condition and the management approach to it (including a comprehensive discussion of the known risks, side effects and potential benefits of treatment), the patient (family) agrees to implement the following specific plan:  Start triamcinolone 0.1 % ointment instead of cream. Apply " "topically twice a day for up to 14 days to active areas as needed. May repeat course after taking 1 week break. Do not use on face, groin, armpits.  Moisturize 2-3 times daily     Moisturizers  I recommend a different one for the face/neck and a different one for the body because the face and neck tend to be more sensitive. The following are some recommendations based on what I like for myself and my patients.       Face and Neck   La Roche-Posay Toleriane Ultra Soothing Repair Moisturizer   Cetaphil Deep Hydration 48 hour Activation Serum  Belif the True Cream Aqua Bomb    If your skin is acne prone, all your moisturizers should be \"oil free\" or \"non comedogenic,\" or \"won't clog pores\"      Body  La Roche-Posay Lipikar Balm AP+ Intense Repair moisturizing cream  CeraVe Moisturizing Cream (comes in a jar)  Neutrogena Hydro Boost Body Gel Cream      Scribe Attestation    I,:  Fern Canales MA am acting as a scribe while in the presence of the attending physician.:       I,:  Amanda Newton PA-C personally performed the services described in this documentation    as scribed in my presence.:                 [1]  Allergies  Allergen Reactions   • Cobalt    • Edetic Acid    • Ethylenediamine    • Mercaptopurine      MERCAPTO MIX   • Thimerosal (Thiomersal)    • Nickel Itching and Rash   [1]    Current Outpatient Medications:   •  ADDERALL XR, 15MG, 15 MG 24 hr capsule, Take 1 capsule (15 mg total) by mouth every morning Max Daily Amount: 15 mg, Disp: 30 capsule, Rfl: 0  •  amphetamine-dextroamphetamine (ADDERALL, 10MG,) 10 mg tablet, Take 1 tablet (10 mg total) by mouth daily as needed (difficulty focusing) Max Daily Amount: 10 mg, Disp: 15 tablet, Rfl: 0  •  Botox 100 units, INJECT UP  UNITS IN THE MUSCLE INTO VARIOUS SITES OF THE HEAD AND NECK ONCE EVERY 3 MONTHS, Disp: 1 each, Rfl: 0  •  cholecalciferol (VITAMIN D3) 1,000 units tablet, Take 1,000 Units by mouth in the morning., Disp: , Rfl:   •  Doxepin HCl 6 MG " TABS, Take 1 tablet (6 mg total) by mouth daily at bedtime, Disp: 30 tablet, Rfl: 2  •  famotidine (PEPCID) 20 mg tablet, Take 1 tablet (20 mg total) by mouth daily, Disp: , Rfl:   •  levonorgestrel (MIRENA) 20 MCG/24HR IUD, by Intrauterine route, Disp: , Rfl:   •  ondansetron (ZOFRAN) 4 mg tablet, Take 1 tablet (4 mg total) by mouth every 8 (eight) hours as needed for nausea or vomiting, Disp: 10 tablet, Rfl: 0  •  rimegepant sulfate (Nurtec) 75 mg TBDP, Take 1 tablet (75 mg total) by mouth as needed (migraine) Limit of 1 in 24 hours, Disp: 16 tablet, Rfl: 6  •  Semaglutide-Weight Management (Wegovy) 1.7 MG/0.75ML, Inject 0.75 mL (1.7 mg total) under the skin once a week, Disp: 3 mL, Rfl: 3  •  sucralfate (CARAFATE) 1 g tablet, TAKE 1 TABLET BY MOUTH 3 (THREE) TIMES A DAY AS NEEDED (HEADACHE-TAKE BEFORE INDOMETHACIN), Disp: 270 tablet, Rfl: 0  •  triamcinolone (KENALOG) 0.1 % ointment, Apply topically 2 (two) times a day for up to 14 days to active areas as needed. May repeat course after taking 1 week break. Do not use on face, groin, armpits., Disp: 80 g, Rfl: 1  •  valACYclovir (VALTREX) 1,000 mg tablet, , Disp: , Rfl:

## 2025-08-04 ENCOUNTER — TELEPHONE (OUTPATIENT)
Age: 35
End: 2025-08-04

## 2025-08-21 DIAGNOSIS — F90.0 ATTENTION DEFICIT HYPERACTIVITY DISORDER (ADHD), PREDOMINANTLY INATTENTIVE TYPE: ICD-10-CM

## 2025-08-22 RX ORDER — DEXTROAMPHETAMINE/AMPHETAMINE 15 MG
15 CAPSULE, EXT RELEASE 24 HR ORAL EVERY MORNING
Qty: 30 CAPSULE | Refills: 0 | Status: SHIPPED | OUTPATIENT
Start: 2025-08-22

## 2025-08-22 RX ORDER — DEXTROAMPHETAMINE SACCHARATE, AMPHETAMINE ASPARTATE, DEXTROAMPHETAMINE SULFATE AND AMPHETAMINE SULFATE 2.5; 2.5; 2.5; 2.5 MG/1; MG/1; MG/1; MG/1
10 TABLET ORAL DAILY PRN
Qty: 15 TABLET | Refills: 0 | Status: SHIPPED | OUTPATIENT
Start: 2025-08-22